# Patient Record
Sex: FEMALE | Race: WHITE | HISPANIC OR LATINO
[De-identification: names, ages, dates, MRNs, and addresses within clinical notes are randomized per-mention and may not be internally consistent; named-entity substitution may affect disease eponyms.]

---

## 2018-01-26 ENCOUNTER — APPOINTMENT (OUTPATIENT)
Dept: MAMMOGRAPHY | Facility: CLINIC | Age: 48
End: 2018-01-26
Payer: COMMERCIAL

## 2018-01-26 ENCOUNTER — OUTPATIENT (OUTPATIENT)
Dept: OUTPATIENT SERVICES | Facility: HOSPITAL | Age: 48
LOS: 1 days | End: 2018-01-26
Payer: COMMERCIAL

## 2018-01-26 ENCOUNTER — APPOINTMENT (OUTPATIENT)
Dept: ULTRASOUND IMAGING | Facility: CLINIC | Age: 48
End: 2018-01-26
Payer: COMMERCIAL

## 2018-01-26 DIAGNOSIS — Z98.89 OTHER SPECIFIED POSTPROCEDURAL STATES: Chronic | ICD-10-CM

## 2018-01-26 DIAGNOSIS — D17.1 BENIGN LIPOMATOUS NEOPLASM OF SKIN AND SUBCUTANEOUS TISSUE OF TRUNK: Chronic | ICD-10-CM

## 2018-01-26 DIAGNOSIS — D48.9 NEOPLASM OF UNCERTAIN BEHAVIOR, UNSPECIFIED: Chronic | ICD-10-CM

## 2018-01-26 DIAGNOSIS — Z12.31 ENCOUNTER FOR SCREENING MAMMOGRAM FOR MALIGNANT NEOPLASM OF BREAST: ICD-10-CM

## 2018-01-26 PROCEDURE — 77063 BREAST TOMOSYNTHESIS BI: CPT | Mod: 26

## 2018-01-26 PROCEDURE — 77067 SCR MAMMO BI INCL CAD: CPT | Mod: 26

## 2018-01-26 PROCEDURE — 76641 ULTRASOUND BREAST COMPLETE: CPT | Mod: 26,50

## 2018-01-26 PROCEDURE — 77067 SCR MAMMO BI INCL CAD: CPT

## 2018-01-26 PROCEDURE — 77063 BREAST TOMOSYNTHESIS BI: CPT

## 2018-01-26 PROCEDURE — 76641 ULTRASOUND BREAST COMPLETE: CPT

## 2018-03-05 ENCOUNTER — EMERGENCY (EMERGENCY)
Facility: HOSPITAL | Age: 48
LOS: 1 days | Discharge: HOME | End: 2018-03-05
Attending: EMERGENCY MEDICINE | Admitting: EMERGENCY MEDICINE
Payer: COMMERCIAL

## 2018-03-05 VITALS — HEIGHT: 62 IN | WEIGHT: 250 LBS

## 2018-03-05 VITALS
OXYGEN SATURATION: 98 % | RESPIRATION RATE: 18 BRPM | DIASTOLIC BLOOD PRESSURE: 67 MMHG | HEART RATE: 98 BPM | TEMPERATURE: 98 F | SYSTOLIC BLOOD PRESSURE: 100 MMHG

## 2018-03-05 DIAGNOSIS — Z98.89 OTHER SPECIFIED POSTPROCEDURAL STATES: Chronic | ICD-10-CM

## 2018-03-05 DIAGNOSIS — D17.1 BENIGN LIPOMATOUS NEOPLASM OF SKIN AND SUBCUTANEOUS TISSUE OF TRUNK: Chronic | ICD-10-CM

## 2018-03-05 DIAGNOSIS — D48.9 NEOPLASM OF UNCERTAIN BEHAVIOR, UNSPECIFIED: Chronic | ICD-10-CM

## 2018-03-05 LAB
D DIMER BLD IA.RAPID-MCNC: 214 NG/ML DDU — SIGNIFICANT CHANGE UP
HCT VFR BLD CALC: 40.2 % — SIGNIFICANT CHANGE UP (ref 37–47)
HGB BLD-MCNC: 13.1 G/DL — SIGNIFICANT CHANGE UP (ref 12–16)
MCHC RBC-ENTMCNC: 27 PG — SIGNIFICANT CHANGE UP (ref 27–31)
MCHC RBC-ENTMCNC: 32.6 G/DL — SIGNIFICANT CHANGE UP (ref 32–36)
MCV RBC AUTO: 82.9 FL — SIGNIFICANT CHANGE UP (ref 81–99)
PLATELET # BLD AUTO: 260 K/UL — SIGNIFICANT CHANGE UP (ref 150–400)
RBC # BLD: 4.85 M/UL — SIGNIFICANT CHANGE UP (ref 4.4–5.2)
RBC # FLD: 13.9 % — SIGNIFICANT CHANGE UP (ref 11–15.6)
TROPONIN T SERPL-MCNC: <0.01 NG/ML — SIGNIFICANT CHANGE UP (ref 0–0.06)
WBC # BLD: 7.8 K/UL — SIGNIFICANT CHANGE UP (ref 4.8–10.8)
WBC # FLD AUTO: 7.8 K/UL — SIGNIFICANT CHANGE UP (ref 4.8–10.8)

## 2018-03-05 PROCEDURE — 71046 X-RAY EXAM CHEST 2 VIEWS: CPT | Mod: 26

## 2018-03-05 PROCEDURE — 93010 ELECTROCARDIOGRAM REPORT: CPT

## 2018-03-05 PROCEDURE — 99284 EMERGENCY DEPT VISIT MOD MDM: CPT

## 2018-03-05 RX ORDER — METRONIDAZOLE 500 MG
500 TABLET ORAL ONCE
Qty: 0 | Refills: 0 | Status: COMPLETED | OUTPATIENT
Start: 2018-03-05 | End: 2018-03-05

## 2018-03-05 RX ORDER — CIPROFLOXACIN LACTATE 400MG/40ML
500 VIAL (ML) INTRAVENOUS ONCE
Qty: 0 | Refills: 0 | Status: COMPLETED | OUTPATIENT
Start: 2018-03-05 | End: 2018-03-05

## 2018-03-05 RX ORDER — SODIUM CHLORIDE 9 MG/ML
1000 INJECTION INTRAMUSCULAR; INTRAVENOUS; SUBCUTANEOUS ONCE
Qty: 0 | Refills: 0 | Status: COMPLETED | OUTPATIENT
Start: 2018-03-05 | End: 2018-03-05

## 2018-03-05 RX ORDER — MOXIFLOXACIN HYDROCHLORIDE TABLETS, 400 MG 400 MG/1
1 TABLET, FILM COATED ORAL
Qty: 6 | Refills: 0 | OUTPATIENT
Start: 2018-03-05 | End: 2018-03-07

## 2018-03-05 RX ORDER — ONDANSETRON 8 MG/1
4 TABLET, FILM COATED ORAL ONCE
Qty: 0 | Refills: 0 | Status: COMPLETED | OUTPATIENT
Start: 2018-03-05 | End: 2018-03-05

## 2018-03-05 RX ORDER — METRONIDAZOLE 500 MG
1 TABLET ORAL
Qty: 21 | Refills: 0 | OUTPATIENT
Start: 2018-03-05 | End: 2018-03-11

## 2018-03-05 RX ADMIN — Medication 500 MILLIGRAM(S): at 21:30

## 2018-03-05 RX ADMIN — SODIUM CHLORIDE 1000 MILLILITER(S): 9 INJECTION INTRAMUSCULAR; INTRAVENOUS; SUBCUTANEOUS at 21:31

## 2018-03-05 RX ADMIN — ONDANSETRON 4 MILLIGRAM(S): 8 TABLET, FILM COATED ORAL at 21:30

## 2018-03-05 NOTE — ED PROVIDER NOTE - OBJECTIVE STATEMENT
46 yo F presented to ED with c/o diarrhea x 3 days. Pt was in [place] x 1 week - return yesterday, buit reports diarrhea since sat. No fevers/chills. Pt reports traved alone. No bloody stool. Some campy abd pain. 48 yo F Hypothyroidism and pericarditis presented to ED with c/o diarrhea x 3 days. Pt was in [place] x 1 week - return yesterday, built reports diarrhea since sat. No fevers/chills. Pt reports traveled alone. No bloody stool. Some campy abd pain. Pt also reports some "chest discomfort" while on airplane at 930am yesterday but resolved after 10 min. No SOB. NO diaphoresis. nonsmoker. No OCP. + nausea. No vomiting.

## 2018-03-05 NOTE — ED PROVIDER NOTE - PROGRESS NOTE DETAILS
results discussed with PT. PT reassessed; PT abdomen soft non tender, PT stable. PT states she feels better and wishes to go home. PT given referral to GI MD. rx abx. PT verbalized understanding of diagnosis and importance of follow up at [facility]. PT educated on importance of follow up and when to return to the ED.

## 2018-03-05 NOTE — ED PROVIDER NOTE - ATTENDING CONTRIBUTION TO CARE
I personally saw the patient with the PA, and completed the key components of the history and physical exam. I then discussed the management plan with the PA.    48 yo F w/ h/o hypothyroidism and pericarditis presented to ED with c/o diarrhea x 3 days. Recent traip to [place] x 1 week - return yesterday, with new onset watery stools since Saturday. Denies fevers/chills/ focal pain/blood in stool.  Some campy abd pain. Pt also reports some "chest discomfort" while on airplane at 930am yesterday but resolved after 10 min. Pain at that time was nonradiating, not associated with any other symptoms.   No OCP. + nausea. No vomiting. She denies any prior cardiac history despite her diagnosis of pericarditis in the past.  VS:  mild tachycardia    GEN - NAD; well appearing; A+O x3   HEAD - NC/AT     ENT - PEERL, EOMI, mucous membranes  moist , no discharge      NECK: Neck supple, non-tender without lymphadenopathy, no masses, no JVD  PULM - CTA b/l,  symmetric breath sounds  COR -  normal heart sounds    ABD - , ND, NT, soft, no guarding, no rebound, no masses    BACK - no CVA tenderness, nontender spine     EXTREMS - no edema, no deformity, warm and well perfused    SKIN - no rash or bruising      NEUROLOGIC - alert, CN 2-12 intact, sensation nl, motor 5/5 RUE/LUE/RLE/LLE.      IMP: Pt presents with watery diarrhea with recent travel to [place].  No focal abdominal tenderness;  also transient chest pain yesterday with no acute EKG changes compared to prior EKG from 2013.  Plan to check labs, send stool studies if pt able to provide sample, check troponin and refer for outpt GI and cardiology f/u.    · Presenting Symptoms: CRAMPS, DIARRHEA, NAUSEA

## 2018-03-05 NOTE — ED PROVIDER NOTE - MEDICAL DECISION MAKING DETAILS
stool culture, cirpo/flagyl Travelers diarrhea- fluid hydration, stool culture, cirpo/flagyl. Given h/o pericarditis and CP will check d-dimer, trop, cxr and ekg

## 2018-03-05 NOTE — ED ADULT NURSE NOTE - OBJECTIVE STATEMENT
Pt axox3 c/o b/l lower abd pain with nausea and vomiting starting saturday. PT recent returned from [place]  on 3/4/2018

## 2018-03-06 PROCEDURE — 96374 THER/PROPH/DIAG INJ IV PUSH: CPT

## 2018-03-06 PROCEDURE — 80053 COMPREHEN METABOLIC PANEL: CPT

## 2018-03-06 PROCEDURE — 93005 ELECTROCARDIOGRAM TRACING: CPT

## 2018-03-06 PROCEDURE — 85379 FIBRIN DEGRADATION QUANT: CPT

## 2018-03-06 PROCEDURE — 87493 C DIFF AMPLIFIED PROBE: CPT

## 2018-03-06 PROCEDURE — 99284 EMERGENCY DEPT VISIT MOD MDM: CPT | Mod: 25

## 2018-03-06 PROCEDURE — 85027 COMPLETE CBC AUTOMATED: CPT

## 2018-03-06 PROCEDURE — 84484 ASSAY OF TROPONIN QUANT: CPT

## 2018-03-06 PROCEDURE — 71046 X-RAY EXAM CHEST 2 VIEWS: CPT

## 2018-03-06 PROCEDURE — 36415 COLL VENOUS BLD VENIPUNCTURE: CPT

## 2018-03-07 LAB
C DIFF BY PCR RESULT: SIGNIFICANT CHANGE UP
C DIFF TOX GENS STL QL NAA+PROBE: SIGNIFICANT CHANGE UP

## 2018-03-07 NOTE — ED POST DISCHARGE NOTE - RESULT SUMMARY
spoke to lab, GI PCN unable to process due to low sample, advised to f/u with PCP to rpt stool studies if symptomatic, LM

## 2018-04-11 ENCOUNTER — NON-APPOINTMENT (OUTPATIENT)
Age: 48
End: 2018-04-11

## 2018-04-11 ENCOUNTER — APPOINTMENT (OUTPATIENT)
Dept: CARDIOLOGY | Facility: CLINIC | Age: 48
End: 2018-04-11
Payer: COMMERCIAL

## 2018-04-11 ENCOUNTER — TRANSCRIPTION ENCOUNTER (OUTPATIENT)
Age: 48
End: 2018-04-11

## 2018-04-11 VITALS
HEART RATE: 98 BPM | SYSTOLIC BLOOD PRESSURE: 134 MMHG | BODY MASS INDEX: 38.96 KG/M2 | OXYGEN SATURATION: 98 % | WEIGHT: 213 LBS | DIASTOLIC BLOOD PRESSURE: 80 MMHG

## 2018-04-11 PROCEDURE — 93000 ELECTROCARDIOGRAM COMPLETE: CPT

## 2018-04-11 PROCEDURE — 99243 OFF/OP CNSLTJ NEW/EST LOW 30: CPT | Mod: 25

## 2018-04-11 RX ORDER — FAMOTIDINE 20 MG/1
20 TABLET, FILM COATED ORAL
Qty: 180 | Refills: 0 | Status: ACTIVE | COMMUNITY
Start: 2018-04-11

## 2018-04-11 RX ORDER — OMALIZUMAB 202.5 MG/1.4ML
150 INJECTION, SOLUTION SUBCUTANEOUS
Qty: 1 | Refills: 0 | Status: ACTIVE | COMMUNITY
Start: 2018-04-11

## 2018-04-24 LAB
ANION GAP SERPL CALC-SCNC: 14 MMOL/L
BASOPHILS # BLD AUTO: 0.04 K/UL
BASOPHILS NFR BLD AUTO: 0.6 %
BUN SERPL-MCNC: 16 MG/DL
CALCIUM SERPL-MCNC: 9.6 MG/DL
CHLORIDE SERPL-SCNC: 102 MMOL/L
CHOLEST SERPL-MCNC: 172 MG/DL
CHOLEST/HDLC SERPL: 2.5 RATIO
CK SERPL-CCNC: 145 U/L
CO2 SERPL-SCNC: 24 MMOL/L
CREAT SERPL-MCNC: 0.93 MG/DL
EOSINOPHIL # BLD AUTO: 0.1 K/UL
EOSINOPHIL NFR BLD AUTO: 1.5 %
GLUCOSE SERPL-MCNC: 95 MG/DL
HBA1C MFR BLD HPLC: 5.9 %
HCT VFR BLD CALC: 40.9 %
HDLC SERPL-MCNC: 68 MG/DL
HGB BLD-MCNC: 13.3 G/DL
IMM GRANULOCYTES NFR BLD AUTO: 0 %
LDLC SERPL CALC-MCNC: 91 MG/DL
LYMPHOCYTES # BLD AUTO: 2.27 K/UL
LYMPHOCYTES NFR BLD AUTO: 34.6 %
MAN DIFF?: NORMAL
MCHC RBC-ENTMCNC: 27.4 PG
MCHC RBC-ENTMCNC: 32.5 GM/DL
MCV RBC AUTO: 84.3 FL
MONOCYTES # BLD AUTO: 0.39 K/UL
MONOCYTES NFR BLD AUTO: 5.9 %
NEUTROPHILS # BLD AUTO: 3.76 K/UL
NEUTROPHILS NFR BLD AUTO: 57.4 %
PLATELET # BLD AUTO: 273 K/UL
POTASSIUM SERPL-SCNC: 4.8 MMOL/L
RBC # BLD: 4.85 M/UL
RBC # FLD: 14.5 %
SODIUM SERPL-SCNC: 140 MMOL/L
TRIGL SERPL-MCNC: 63 MG/DL
TSH SERPL-ACNC: 1.61 UIU/ML
WBC # FLD AUTO: 6.56 K/UL

## 2018-05-22 ENCOUNTER — APPOINTMENT (OUTPATIENT)
Dept: CARDIOLOGY | Facility: CLINIC | Age: 48
End: 2018-05-22
Payer: COMMERCIAL

## 2018-05-22 PROCEDURE — 78452 HT MUSCLE IMAGE SPECT MULT: CPT

## 2018-05-22 PROCEDURE — A9500: CPT

## 2018-05-22 PROCEDURE — 93015 CV STRESS TEST SUPVJ I&R: CPT

## 2018-05-22 PROCEDURE — 93306 TTE W/DOPPLER COMPLETE: CPT

## 2018-07-25 ENCOUNTER — APPOINTMENT (OUTPATIENT)
Dept: CARDIOLOGY | Facility: CLINIC | Age: 48
End: 2018-07-25

## 2018-07-30 PROBLEM — I31.9 DISEASE OF PERICARDIUM, UNSPECIFIED: Chronic | Status: ACTIVE | Noted: 2018-03-05

## 2018-08-17 ENCOUNTER — EMERGENCY (EMERGENCY)
Facility: HOSPITAL | Age: 48
LOS: 1 days | Discharge: HOME | End: 2018-08-17
Attending: EMERGENCY MEDICINE
Payer: COMMERCIAL

## 2018-08-17 VITALS
SYSTOLIC BLOOD PRESSURE: 141 MMHG | HEIGHT: 62 IN | OXYGEN SATURATION: 100 % | RESPIRATION RATE: 18 BRPM | WEIGHT: 210.1 LBS | TEMPERATURE: 98 F | HEART RATE: 87 BPM | DIASTOLIC BLOOD PRESSURE: 88 MMHG

## 2018-08-17 DIAGNOSIS — Z98.89 OTHER SPECIFIED POSTPROCEDURAL STATES: Chronic | ICD-10-CM

## 2018-08-17 DIAGNOSIS — D17.1 BENIGN LIPOMATOUS NEOPLASM OF SKIN AND SUBCUTANEOUS TISSUE OF TRUNK: Chronic | ICD-10-CM

## 2018-08-17 DIAGNOSIS — D48.9 NEOPLASM OF UNCERTAIN BEHAVIOR, UNSPECIFIED: Chronic | ICD-10-CM

## 2018-08-17 PROCEDURE — 99283 EMERGENCY DEPT VISIT LOW MDM: CPT

## 2018-08-17 PROCEDURE — 73660 X-RAY EXAM OF TOE(S): CPT

## 2018-08-17 PROCEDURE — 73660 X-RAY EXAM OF TOE(S): CPT | Mod: 26,LT

## 2018-08-17 RX ORDER — CEPHALEXIN 500 MG
1 CAPSULE ORAL
Qty: 14 | Refills: 0 | OUTPATIENT
Start: 2018-08-17 | End: 2018-08-23

## 2018-08-17 NOTE — ED STATDOCS - OBJECTIVE STATEMENT
48 year olf female, hx of ingrown toe nails presenting with left ingrown toe nail and stubbed the toe yesterday. states that she tried to get an apt with [facility] and that he could not see her until 1 week. states that she tried to cut the nail herself. states that she had some discoloration and drainage from the toe. denies fever chills nausea or vomiting. numbness or tingling in the foot. DMII controlled on medications.

## 2018-08-17 NOTE — ED STATDOCS - PROGRESS NOTE DETAILS
multiple calls made to podiatry  DC with keflex and FU in office tmrw. pt states that she called podiatry office and they squeeze her in

## 2018-08-17 NOTE — ED STATDOCS - ATTENDING CONTRIBUTION TO CARE
49 yo F, hx of DM, presented with ingrown left toe nail and pain after she stubbed her toe. Full ROM. mild  erythema around the nail, no purulent drainage. sensation intact. Multiple call to podiatry without response. patient comfortable going on Keflex and outpatient follow up.

## 2018-08-17 NOTE — ED STATDOCS - PHYSICAL EXAMINATION
LEFT BIG TOE: + ingrown nail with blistering to the distal toe. no erythema noted. no fluctuance or induration. FROM of all toes 2+ dp/pt. sensation intact.

## 2018-11-19 ENCOUNTER — APPOINTMENT (OUTPATIENT)
Dept: ENDOCRINOLOGY | Facility: CLINIC | Age: 48
End: 2018-11-19

## 2018-12-17 ENCOUNTER — RX RENEWAL (OUTPATIENT)
Age: 48
End: 2018-12-17

## 2019-02-08 ENCOUNTER — RX RENEWAL (OUTPATIENT)
Age: 49
End: 2019-02-08

## 2019-03-02 ENCOUNTER — LABORATORY RESULT (OUTPATIENT)
Age: 49
End: 2019-03-02

## 2019-03-05 LAB
25(OH)D3 SERPL-MCNC: 30.6 NG/ML
ALBUMIN SERPL ELPH-MCNC: 4.2 G/DL
ALP BLD-CCNC: 82 U/L
ALT SERPL-CCNC: 18 U/L
ANION GAP SERPL CALC-SCNC: 9 MMOL/L
APPEARANCE: ABNORMAL
AST SERPL-CCNC: 19 U/L
BASOPHILS # BLD AUTO: 0.02 K/UL
BASOPHILS NFR BLD AUTO: 0.3 %
BILIRUB SERPL-MCNC: 0.3 MG/DL
BILIRUBIN URINE: NEGATIVE
BLOOD URINE: NEGATIVE
BUN SERPL-MCNC: 14 MG/DL
CALCIUM SERPL-MCNC: 9.1 MG/DL
CHLORIDE SERPL-SCNC: 103 MMOL/L
CHOLEST SERPL-MCNC: 134 MG/DL
CHOLEST/HDLC SERPL: 2.7 RATIO
CO2 SERPL-SCNC: 27 MMOL/L
COLOR: YELLOW
CREAT SERPL-MCNC: 0.82 MG/DL
CREAT SPEC-SCNC: 209 MG/DL
EOSINOPHIL # BLD AUTO: 0.09 K/UL
EOSINOPHIL NFR BLD AUTO: 1.5 %
GLUCOSE QUALITATIVE U: NEGATIVE
GLUCOSE SERPL-MCNC: 86 MG/DL
HBA1C MFR BLD HPLC: 5.8 %
HCT VFR BLD CALC: 35.9 %
HDLC SERPL-MCNC: 50 MG/DL
HGB BLD-MCNC: 11.2 G/DL
IMM GRANULOCYTES NFR BLD AUTO: 0.2 %
KETONES URINE: NEGATIVE
LDLC SERPL CALC-MCNC: 71 MG/DL
LEUKOCYTE ESTERASE URINE: NEGATIVE
LYMPHOCYTES # BLD AUTO: 2.18 K/UL
LYMPHOCYTES NFR BLD AUTO: 37.1 %
MAN DIFF?: NORMAL
MCHC RBC-ENTMCNC: 25.7 PG
MCHC RBC-ENTMCNC: 31.2 GM/DL
MCV RBC AUTO: 82.5 FL
MICROALBUMIN 24H UR DL<=1MG/L-MCNC: <1.2 MG/DL
MICROALBUMIN/CREAT 24H UR-RTO: NORMAL MG/G
MONOCYTES # BLD AUTO: 0.36 K/UL
MONOCYTES NFR BLD AUTO: 6.1 %
NEUTROPHILS # BLD AUTO: 3.22 K/UL
NEUTROPHILS NFR BLD AUTO: 54.8 %
NITRITE URINE: NEGATIVE
PH URINE: 6.5
PLATELET # BLD AUTO: 249 K/UL
POTASSIUM SERPL-SCNC: 4.1 MMOL/L
PROT SERPL-MCNC: 6.8 G/DL
PROTEIN URINE: NORMAL
RBC # BLD: 4.35 M/UL
RBC # FLD: 14.4 %
SODIUM SERPL-SCNC: 139 MMOL/L
SPECIFIC GRAVITY URINE: 1.03
T3FREE SERPL-MCNC: 3.21 PG/ML
T4 FREE SERPL-MCNC: 1.8 NG/DL
TRIGL SERPL-MCNC: 67 MG/DL
TSH SERPL-ACNC: 1.08 UIU/ML
UROBILINOGEN URINE: NORMAL
VIT B12 SERPL-MCNC: 506 PG/ML
WBC # FLD AUTO: 5.88 K/UL

## 2019-03-06 ENCOUNTER — RECORD ABSTRACTING (OUTPATIENT)
Age: 49
End: 2019-03-06

## 2019-03-06 DIAGNOSIS — Z78.9 OTHER SPECIFIED HEALTH STATUS: ICD-10-CM

## 2019-03-08 ENCOUNTER — APPOINTMENT (OUTPATIENT)
Dept: ENDOCRINOLOGY | Facility: CLINIC | Age: 49
End: 2019-03-08
Payer: COMMERCIAL

## 2019-03-08 VITALS
DIASTOLIC BLOOD PRESSURE: 70 MMHG | HEART RATE: 74 BPM | BODY MASS INDEX: 37.54 KG/M2 | OXYGEN SATURATION: 98 % | HEIGHT: 62 IN | WEIGHT: 204 LBS | SYSTOLIC BLOOD PRESSURE: 110 MMHG

## 2019-03-08 LAB — GLUCOSE BLDC GLUCOMTR-MCNC: 172

## 2019-03-08 PROCEDURE — 82962 GLUCOSE BLOOD TEST: CPT

## 2019-03-08 PROCEDURE — 99214 OFFICE O/P EST MOD 30 MIN: CPT | Mod: 25

## 2019-03-08 RX ORDER — DULAGLUTIDE 0.75 MG/.5ML
0.75 INJECTION, SOLUTION SUBCUTANEOUS
Qty: 3 | Refills: 0 | Status: DISCONTINUED | COMMUNITY
Start: 2018-04-11 | End: 2019-03-08

## 2019-03-09 NOTE — ASSESSMENT
[FreeTextEntry1] : 49 y/o female with Type 2 DM, and Hypothyroidism. Labs reviewed from 3/2/19- A1C 5.8%, chol 134, FBG 86, and TSH 1.08.\par \par Plan: \par Type 2 DM: Continue current medication regimen: Trulicity 1.5 mg weekly\par - continue self BS monitoring\par - educated on healthy food choices\par - encouraged to increase routine exercise\par \par Hypothyroidism: monitor labs, continue current dose of Levothyroxine 125 mcg daily\par \par Labs and follow up in 3 months.\par \par

## 2019-03-09 NOTE — REVIEW OF SYSTEMS
[Fatigue] : fatigue [Recent Weight Loss (___ Lbs)] : recent [unfilled] ~Ulb weight loss [Blurry Vision] : blurred vision [SOB on Exertion] : shortness of breath during exertion [Polyuria] : polyuria [Hair Loss] : hair loss [Dry Skin] : dry skin [Headache] : headaches [Cold Intolerance] : cold intolerant [Decreased Appetite] : appetite not decreased [Visual Field Defect] : no visual field defect [Dysphagia] : no dysphagia [Dysphonia] : no dysphonia [Neck Pain] : no neck pain [Chest Pain] : no chest pain [Palpitations] : no palpitations [Constipation] : no constipation [Diarrhea] : no diarrhea [Dysuria] : no dysuria [Tremors] : no tremors [Depression] : no depression [Anxiety] : no anxiety [Polydipsia] : no polydipsia [Heat Intolerance] : heat tolerant [Easy Bruising] : no tendency for easy bruising [Swelling] : no swelling [de-identified] : once a month or once every couple of months

## 2019-03-09 NOTE — HISTORY OF PRESENT ILLNESS
[FreeTextEntry1] : Quality: Type 2 DM\par Severity: controlled\par Duration: a couple years ago\par Onset: GDM 19 years ago, routine labs\par Modifying Factors: Better with medication\par Associated Symptoms:\par \par Current Regimen:\par Trulicity 1.5 mg weekly\par \par -Levothyroxine 125 mcg daily\par \par Self blood sugar monitorin-2 times a day, per logs\par before and after meals BS < 130\par no hypoglycemic symptoms\par \par exercise: none\par \par Diet: Weight watchers\par B- oatmeal, almond milk, egg whites with spinach, whole wheat, coffee with some sugar\par L- turkey chilli, brown rice,\par D- pizza, varies\par Snacks- fruit \par \par Date of last eye exam: 2 years ago  (-) diabetic retinopathy\par Date of last foot exam: every 6 months\par Date of last flu vaccine: 2018\par Date of last Pneumovax: no

## 2019-03-09 NOTE — PHYSICAL EXAM
[Alert] : alert [No Acute Distress] : no acute distress [Well Nourished] : well nourished [Well Developed] : well developed [Normal Sclera/Conjunctiva] : normal sclera/conjunctiva [EOMI] : extra ocular movement intact [Supple] : the neck was supple [No LAD] : no lymphadenopathy [Thyroid Not Enlarged] : the thyroid was not enlarged [No Thyroid Nodules] : there were no palpable thyroid nodules [Normal Rate and Effort] : normal respiratory rhythm and effort [No Accessory Muscle Use] : no accessory muscle use [Clear to Auscultation] : lungs were clear to auscultation bilaterally [Normal Rate] : heart rate was normal  [Normal S1, S2] : normal S1 and S2 [Regular Rhythm] : with a regular rhythm [Pedal Pulses Normal] : the pedal pulses are present [No Edema] : there was no peripheral edema [Normal Bowel Sounds] : normal bowel sounds [Not Tender] : non-tender [Soft] : abdomen soft [Normal Gait] : normal gait [Acanthosis Nigricans] : no acanthosis nigricans [Right Foot Was Examined] : right foot ~C was examined [Left Foot Was Examined] : left foot ~C was examined [Normal] : normal [Full ROM] : with full range of motion [Diminished Throughout Both Feet] : normal tactile sensation with monofilament testing throughout both feet [No Motor Deficits] : the motor exam was normal [No Tremors] : no tremors [Oriented x3] : oriented to person, place, and time [Normal Insight/Judgement] : insight and judgment were intact [Normal Mood] : the mood was normal

## 2019-05-18 ENCOUNTER — APPOINTMENT (OUTPATIENT)
Dept: ULTRASOUND IMAGING | Facility: CLINIC | Age: 49
End: 2019-05-18
Payer: COMMERCIAL

## 2019-05-18 ENCOUNTER — APPOINTMENT (OUTPATIENT)
Dept: MAMMOGRAPHY | Facility: CLINIC | Age: 49
End: 2019-05-18
Payer: COMMERCIAL

## 2019-05-18 ENCOUNTER — OUTPATIENT (OUTPATIENT)
Dept: OUTPATIENT SERVICES | Facility: HOSPITAL | Age: 49
LOS: 1 days | End: 2019-05-18
Payer: COMMERCIAL

## 2019-05-18 DIAGNOSIS — D48.9 NEOPLASM OF UNCERTAIN BEHAVIOR, UNSPECIFIED: Chronic | ICD-10-CM

## 2019-05-18 DIAGNOSIS — Z98.89 OTHER SPECIFIED POSTPROCEDURAL STATES: Chronic | ICD-10-CM

## 2019-05-18 DIAGNOSIS — D17.1 BENIGN LIPOMATOUS NEOPLASM OF SKIN AND SUBCUTANEOUS TISSUE OF TRUNK: Chronic | ICD-10-CM

## 2019-05-18 DIAGNOSIS — Z12.31 ENCOUNTER FOR SCREENING MAMMOGRAM FOR MALIGNANT NEOPLASM OF BREAST: ICD-10-CM

## 2019-05-18 PROCEDURE — 76641 ULTRASOUND BREAST COMPLETE: CPT

## 2019-05-18 PROCEDURE — 77063 BREAST TOMOSYNTHESIS BI: CPT | Mod: 26

## 2019-05-18 PROCEDURE — 77067 SCR MAMMO BI INCL CAD: CPT

## 2019-05-18 PROCEDURE — 77067 SCR MAMMO BI INCL CAD: CPT | Mod: 26

## 2019-05-18 PROCEDURE — 76641 ULTRASOUND BREAST COMPLETE: CPT | Mod: 26,50

## 2019-05-18 PROCEDURE — 77063 BREAST TOMOSYNTHESIS BI: CPT

## 2019-07-02 ENCOUNTER — MEDICATION RENEWAL (OUTPATIENT)
Age: 49
End: 2019-07-02

## 2019-07-02 ENCOUNTER — APPOINTMENT (OUTPATIENT)
Dept: ENDOCRINOLOGY | Facility: CLINIC | Age: 49
End: 2019-07-02

## 2019-08-07 ENCOUNTER — APPOINTMENT (OUTPATIENT)
Dept: ENDOCRINOLOGY | Facility: CLINIC | Age: 49
End: 2019-08-07
Payer: COMMERCIAL

## 2019-08-07 VITALS
HEART RATE: 91 BPM | DIASTOLIC BLOOD PRESSURE: 70 MMHG | OXYGEN SATURATION: 98 % | BODY MASS INDEX: 37.17 KG/M2 | HEIGHT: 62 IN | SYSTOLIC BLOOD PRESSURE: 110 MMHG | WEIGHT: 202 LBS

## 2019-08-07 LAB — GLUCOSE BLDC GLUCOMTR-MCNC: 86

## 2019-08-07 PROCEDURE — 82962 GLUCOSE BLOOD TEST: CPT

## 2019-08-07 PROCEDURE — 99214 OFFICE O/P EST MOD 30 MIN: CPT | Mod: 25

## 2019-08-07 NOTE — PHYSICAL EXAM
[Alert] : alert [No Acute Distress] : no acute distress [Well Nourished] : well nourished [Normal Sclera/Conjunctiva] : normal sclera/conjunctiva [Well Developed] : well developed [EOMI] : extra ocular movement intact [Supple] : the neck was supple [No LAD] : no lymphadenopathy [Thyroid Not Enlarged] : the thyroid was not enlarged [No Thyroid Nodules] : there were no palpable thyroid nodules [Normal Rate and Effort] : normal respiratory rhythm and effort [No Accessory Muscle Use] : no accessory muscle use [Clear to Auscultation] : lungs were clear to auscultation bilaterally [Normal Rate] : heart rate was normal  [Normal S1, S2] : normal S1 and S2 [Regular Rhythm] : with a regular rhythm [Pedal Pulses Normal] : the pedal pulses are present [No Edema] : there was no peripheral edema [Normal Bowel Sounds] : normal bowel sounds [Not Tender] : non-tender [Soft] : abdomen soft [Normal Gait] : normal gait [Acanthosis Nigricans] : no acanthosis nigricans [Right Foot Was Examined] : right foot ~C was examined [Left Foot Was Examined] : left foot ~C was examined [Normal] : normal [Full ROM] : with full range of motion [Diminished Throughout Both Feet] : normal tactile sensation with monofilament testing throughout both feet [No Motor Deficits] : the motor exam was normal [No Tremors] : no tremors [Oriented x3] : oriented to person, place, and time [Normal Insight/Judgement] : insight and judgment were intact [Normal Mood] : the mood was normal

## 2019-08-07 NOTE — REVIEW OF SYSTEMS
[Fatigue] : fatigue [Recent Weight Loss (___ Lbs)] : recent [unfilled] ~Ulb weight loss [Neck Pain] : neck pain [Heartburn] : heartburn [Polyuria] : polyuria [Hair Loss] : hair loss [Dry Skin] : dry skin [Headache] : headaches [Anxiety] : anxiety [Polydipsia] : polydipsia [Decreased Appetite] : appetite not decreased [Blurry Vision] : no blurred vision [Visual Field Defect] : no visual field defect [Dysphonia] : no dysphonia [Dysphagia] : no dysphagia [Chest Pain] : no chest pain [Palpitations] : no palpitations [Constipation] : no constipation [SOB on Exertion] : no shortness of breath during exertion [Dysuria] : no dysuria [Diarrhea] : no diarrhea [Tremors] : no tremors [Depression] : no depression [Heat Intolerance] : heat tolerant [Cold Intolerance] : cold tolerant [Easy Bruising] : no tendency for easy bruising [Swelling] : no swelling [FreeTextEntry4] : posterior neck pain  [de-identified] : occasional

## 2019-08-07 NOTE — HISTORY OF PRESENT ILLNESS
[FreeTextEntry1] : Quality: Type 2 DM\par Severity: controlled\par Duration: a couple years ago\par Onset: GDM 19 years ago, routine labs\par Modifying Factors: Better with medication\par Associated Symptoms: denies neuropathy \par \par Current Regimen:\par Trulicity 1.5 mg weekly\par \par -Levothyroxine 125 mcg daily- taking appropriately \par \par Self blood sugar monitoring: not testing, lost meter \par no hypoglycemic symptoms\par \par exercise: none\par \par Diet: stopped Weight watchers\par B- English muffin with peanut butter or ham and cheese, coffee with some sugar and milk \par L- brown rice with chicken\par D- steak, varies\par Snacks- fruit, gluten free pretzels  \par \par Date of last eye exam: 2 years ago  (-) diabetic retinopathy\par Date of last foot exam: every 6 months\par Date of last flu vaccine: 2018\par Date of last Pneumovax: no

## 2019-08-07 NOTE — ASSESSMENT
[Importance of Diet and Exercise] : importance of diet and exercise to improve glycemic control, achieve weight loss and improve cardiovascular health [FreeTextEntry1] : 49 y/o female with Type 2 DM and Hypothyroidism. No recent labs\par \par Plan: \par Type 2 DM: labs drawn today -awaiting results \par - continue current medication regimen: Trulicity 1.5 mg weekly\par - sample glucose meter given\par - start self BS monitoring 1-2 times a day\par - educated on healthy food choices\par - encouraged to increase routine exercise\par - educated on the importance of routine retinopathy screening \par \par Hypothyroidism: monitor TFTs, continue current dose of Levothyroxine 125 mcg daily\par \par Labs and follow up in 3 months. [Retinopathy Screening] : Patient was referred to ophthalmology for retinopathy screening

## 2019-08-08 ENCOUNTER — RESULT REVIEW (OUTPATIENT)
Age: 49
End: 2019-08-08

## 2019-08-08 LAB
24R-OH-CALCIDIOL SERPL-MCNC: 43.6 PG/ML
ALBUMIN SERPL ELPH-MCNC: 4.3 G/DL
ALP BLD-CCNC: 86 U/L
ALT SERPL-CCNC: 23 U/L
ANION GAP SERPL CALC-SCNC: 18 MMOL/L
AST SERPL-CCNC: 19 U/L
BASOPHILS # BLD AUTO: 0.03 K/UL
BASOPHILS NFR BLD AUTO: 0.5 %
BILIRUB SERPL-MCNC: 0.2 MG/DL
BUN SERPL-MCNC: 15 MG/DL
CALCIUM SERPL-MCNC: 10 MG/DL
CHLORIDE SERPL-SCNC: 104 MMOL/L
CHOLEST SERPL-MCNC: 166 MG/DL
CHOLEST/HDLC SERPL: 2.7 RATIO
CO2 SERPL-SCNC: 20 MMOL/L
CREAT SERPL-MCNC: 0.88 MG/DL
CREAT SPEC-SCNC: 83 MG/DL
EOSINOPHIL # BLD AUTO: 0.14 K/UL
EOSINOPHIL NFR BLD AUTO: 2.1 %
GLUCOSE SERPL-MCNC: 94 MG/DL
HBA1C MFR BLD HPLC: 5.9 %
HCT VFR BLD CALC: 41.7 %
HDLC SERPL-MCNC: 61 MG/DL
HGB BLD-MCNC: 12.9 G/DL
IMM GRANULOCYTES NFR BLD AUTO: 0.2 %
LDLC SERPL CALC-MCNC: 90 MG/DL
LYMPHOCYTES # BLD AUTO: 2.12 K/UL
LYMPHOCYTES NFR BLD AUTO: 32.2 %
MAN DIFF?: NORMAL
MCHC RBC-ENTMCNC: 25 PG
MCHC RBC-ENTMCNC: 30.9 GM/DL
MCV RBC AUTO: 80.8 FL
MICROALBUMIN 24H UR DL<=1MG/L-MCNC: <1.2 MG/DL
MICROALBUMIN/CREAT 24H UR-RTO: NORMAL MG/G
MONOCYTES # BLD AUTO: 0.41 K/UL
MONOCYTES NFR BLD AUTO: 6.2 %
NEUTROPHILS # BLD AUTO: 3.88 K/UL
NEUTROPHILS NFR BLD AUTO: 58.8 %
PLATELET # BLD AUTO: 285 K/UL
POTASSIUM SERPL-SCNC: 5 MMOL/L
PROT SERPL-MCNC: 7 G/DL
RBC # BLD: 5.16 M/UL
RBC # FLD: 14.6 %
SODIUM SERPL-SCNC: 142 MMOL/L
T3FREE SERPL-MCNC: 3.97 PG/ML
T4 FREE SERPL-MCNC: 1.6 NG/DL
TRIGL SERPL-MCNC: 74 MG/DL
TSH SERPL-ACNC: 1.25 UIU/ML
VIT B12 SERPL-MCNC: 552 PG/ML
WBC # FLD AUTO: 6.59 K/UL

## 2019-08-23 ENCOUNTER — MEDICATION RENEWAL (OUTPATIENT)
Age: 49
End: 2019-08-23

## 2019-08-23 RX ORDER — BLOOD-GLUCOSE METER
W/DEVICE KIT MISCELLANEOUS
Qty: 1 | Refills: 0 | Status: ACTIVE | COMMUNITY
Start: 2019-08-23 | End: 1900-01-01

## 2019-09-04 ENCOUNTER — APPOINTMENT (OUTPATIENT)
Dept: ENDOCRINOLOGY | Facility: CLINIC | Age: 49
End: 2019-09-04

## 2019-09-29 ENCOUNTER — RX RENEWAL (OUTPATIENT)
Age: 49
End: 2019-09-29

## 2019-11-06 ENCOUNTER — APPOINTMENT (OUTPATIENT)
Dept: ENDOCRINOLOGY | Facility: CLINIC | Age: 49
End: 2019-11-06

## 2020-01-14 ENCOUNTER — RX RENEWAL (OUTPATIENT)
Age: 50
End: 2020-01-14

## 2020-01-27 ENCOUNTER — APPOINTMENT (OUTPATIENT)
Dept: ENDOCRINOLOGY | Facility: CLINIC | Age: 50
End: 2020-01-27
Payer: COMMERCIAL

## 2020-01-27 ENCOUNTER — APPOINTMENT (OUTPATIENT)
Dept: GASTROENTEROLOGY | Facility: CLINIC | Age: 50
End: 2020-01-27
Payer: COMMERCIAL

## 2020-01-27 VITALS
DIASTOLIC BLOOD PRESSURE: 72 MMHG | WEIGHT: 210 LBS | SYSTOLIC BLOOD PRESSURE: 128 MMHG | HEART RATE: 92 BPM | BODY MASS INDEX: 38.64 KG/M2 | HEIGHT: 62 IN

## 2020-01-27 VITALS
BODY MASS INDEX: 38.64 KG/M2 | RESPIRATION RATE: 17 BRPM | HEIGHT: 62 IN | WEIGHT: 210 LBS | OXYGEN SATURATION: 98 % | SYSTOLIC BLOOD PRESSURE: 120 MMHG | DIASTOLIC BLOOD PRESSURE: 72 MMHG

## 2020-01-27 DIAGNOSIS — Z87.19 PERSONAL HISTORY OF OTHER DISEASES OF THE DIGESTIVE SYSTEM: ICD-10-CM

## 2020-01-27 DIAGNOSIS — R14.1 GAS PAIN: ICD-10-CM

## 2020-01-27 DIAGNOSIS — Z78.9 OTHER SPECIFIED HEALTH STATUS: ICD-10-CM

## 2020-01-27 DIAGNOSIS — Z12.11 ENCOUNTER FOR SCREENING FOR MALIGNANT NEOPLASM OF COLON: ICD-10-CM

## 2020-01-27 DIAGNOSIS — R14.0 ABDOMINAL DISTENSION (GASEOUS): ICD-10-CM

## 2020-01-27 DIAGNOSIS — R10.13 EPIGASTRIC PAIN: ICD-10-CM

## 2020-01-27 LAB — GLUCOSE BLDC GLUCOMTR-MCNC: 138

## 2020-01-27 PROCEDURE — 99214 OFFICE O/P EST MOD 30 MIN: CPT | Mod: 25

## 2020-01-27 PROCEDURE — 82962 GLUCOSE BLOOD TEST: CPT

## 2020-01-27 PROCEDURE — 99203 OFFICE O/P NEW LOW 30 MIN: CPT

## 2020-01-27 NOTE — ASSESSMENT
[FreeTextEntry1] : 50 y/o female with Type 2 DM and Hypothyroidism. No recent labs\par \par Plan: \par Type 2 DM: controlled - check A1C today \par - continue current medication regimen: Trulicity 1.5 mg weekly\par - continue self BS monitoring \par - educated on healthy food choices - encouraged to restart Weight Watchers \par - encouraged to increase routine exercise\par - educated on the importance of routine retinopathy screening \par \par Hypothyroidism: check TFTs, continue current dose of Levothyroxine 125 mcg daily\par \par Labs and follow up in 4 months. [Importance of Diet and Exercise] : importance of diet and exercise to improve glycemic control, achieve weight loss and improve cardiovascular health

## 2020-01-27 NOTE — ADDENDUM
[FreeTextEntry1] : I have personally seen and examined the patient. I agree with the history, physical examination, assessment and recommendations as noted above.\par \par Tj Gilbert MD\par Gastroenterology\par

## 2020-01-27 NOTE — HISTORY OF PRESENT ILLNESS
[FreeTextEntry1] : Quality: Type 2 DM\par Severity: controlled\par Duration: a couple years ago\par Onset: GDM 19 years ago, routine labs\par Modifying Factors: Better with medication\par Associated Symptoms: denies neuropathy \par \par Current Regimen:\par Trulicity 1.5 mg weekly\par \par -Levothyroxine 125 mcg daily- taking appropriately \par \par Self blood sugar monitorin times a day, no meter, per logs\par before breakfast: 116, 104, 107, 106\par after breakfast: 112\par after dinner: 124\par no hypoglycemic symptoms\par  today in office \par \par exercise: none\par \par Diet: stopped Weight watchers, will get started again soon\par B- English muffin with peanut butter or ham and cheese, coffee with some sugar and milk \par L- brown rice with chicken\par D- steak, varies\par Snacks- fruit, gluten free pretzels  \par \par Date of last eye exam: 2 years ago  (-) diabetic retinopathy, needs appointment\par Date of last foot exam: sporadically \par Date of last flu vaccine: 2019\par Date of last Pneumovax: no

## 2020-01-27 NOTE — HISTORY OF PRESENT ILLNESS
[Heartburn] : stable heartburn [Nausea] : denies nausea [Vomiting] : denies vomiting [Diarrhea] : denies diarrhea [Constipation] : denies constipation [Yellow Skin Or Eyes (Jaundice)] : denies jaundice [Abdominal Pain] : stable abdominal pain [Abdominal Swelling] : abdominal swelling stable [Rectal Pain] : denies rectal pain [de-identified] : VERONICA ZAMBRANO is a 49 year old female presenting today with complaints of epigastric pain and bloating for several months. She reports that her symptoms occur several times a month. She will take Famotidine to relieve her symptoms. She has taken Omeprazole, Esomeprazole and Pantoprazole in the past for the same reasons. She had an EGD about 20 years ago but she does not recall the results. She knows that certain foods like anything spicy or greasy will trigger her symptoms so she does her best to avoid consuming those. She  denies any nausea, vomiting, dysphagia or odynophagia.\par She has never had a colonosocpy before. She is unaware of any family history of colon cancer or polyps. She moves her bowels daily. She denies any constipation, diarrhea, black or bloody stool. Her medical history includes hypothyroidism and diabetes. Her BMI is 38.41.

## 2020-01-27 NOTE — ASSESSMENT
[FreeTextEntry1] : The patient presents today with complaints of epigastric pain, bloating and occasional heartburn. She will be scheduled for an upper endoscopy for further evaluation. She will continue to take Famotidine as needed for symptom control. She was instructed on both an anti reflux diet and a low FODMAP diet. She will also be scheduled for a colonoscopy with Suprep. I have discussed the indications, benefits, risks, and alternatives to EGD and colonoscopy with the patient. The patient understands all options and has agreed to have both procedures. She is medically optimized.

## 2020-01-27 NOTE — REASON FOR VISIT
[Initial Evaluation] : an initial evaluation [FreeTextEntry1] : epigastric pain, bloating, colon cancer screening

## 2020-01-27 NOTE — PHYSICAL EXAM
[Alert] : alert [No Acute Distress] : no acute distress [Well Nourished] : well nourished [Well Developed] : well developed [Normal Sclera/Conjunctiva] : normal sclera/conjunctiva [EOMI] : extra ocular movement intact [Supple] : the neck was supple [No LAD] : no lymphadenopathy [Thyroid Not Enlarged] : the thyroid was not enlarged [No Thyroid Nodules] : there were no palpable thyroid nodules [Normal Rate and Effort] : normal respiratory rhythm and effort [No Accessory Muscle Use] : no accessory muscle use [Clear to Auscultation] : lungs were clear to auscultation bilaterally [Normal Rate] : heart rate was normal  [Normal S1, S2] : normal S1 and S2 [Regular Rhythm] : with a regular rhythm [Pedal Pulses Normal] : the pedal pulses are present [No Edema] : there was no peripheral edema [Normal Bowel Sounds] : normal bowel sounds [Not Tender] : non-tender [Soft] : abdomen soft [Normal Gait] : normal gait [No Rash] : no rash [Foot Ulcers] : no foot ulcers [Acanthosis Nigricans] : no acanthosis nigricans [Right Foot Was Examined] : right foot ~C was examined [Left Foot Was Examined] : left foot ~C was examined [Normal] : normal [Full ROM] : with full range of motion [Diminished Throughout Both Feet] : normal tactile sensation with monofilament testing throughout both feet [No Motor Deficits] : the motor exam was normal [No Tremors] : no tremors [Oriented x3] : oriented to person, place, and time [Normal Insight/Judgement] : insight and judgment were intact [Normal Mood] : the mood was normal

## 2020-01-27 NOTE — REVIEW OF SYSTEMS
[Hair Loss] : hair loss [Dry Skin] : dry skin [Anxiety] : anxiety [Cold Intolerance] : cold intolerant [Fatigue] : no fatigue [Decreased Appetite] : appetite not decreased [Recent Weight Gain (___ Lbs)] : no recent weight gain [Recent Weight Loss (___ Lbs)] : no recent weight loss [Visual Field Defect] : no visual field defect [Blurry Vision] : no blurred vision [Dysphagia] : no dysphagia [Dysphonia] : no dysphonia [Neck Pain] : no neck pain [Chest Pain] : no chest pain [Palpitations] : no palpitations [Constipation] : no constipation [Diarrhea] : no diarrhea [Polyuria] : no polyuria [Dysuria] : no dysuria [Headache] : no headaches [Tremors] : no tremors [Depression] : no depression [Polydipsia] : no polydipsia [Heat Intolerance] : heat tolerant [Easy Bruising] : no tendency for easy bruising [Swelling] : no swelling [FreeTextEntry2] : weight stable

## 2020-01-27 NOTE — REVIEW OF SYSTEMS
Pt notified that Dr. Araiza spoke to radiologist directly and confirmed presence of stool back up. Pt is to continue with plan as directed with miralax, pushing water and increasing fiber in diet. He is welcome to call if needs anything further.     Pt verbalized understanding.   [As Noted in HPI] : as noted in HPI [Negative] : Heme/Lymph

## 2020-02-01 ENCOUNTER — LABORATORY RESULT (OUTPATIENT)
Age: 50
End: 2020-02-01

## 2020-04-25 ENCOUNTER — MESSAGE (OUTPATIENT)
Age: 50
End: 2020-04-25

## 2020-05-03 LAB
SARS-COV-2 IGG SERPL IA-ACNC: <0.1 INDEX
SARS-COV-2 IGG SERPL QL IA: NEGATIVE

## 2020-08-15 ENCOUNTER — LABORATORY RESULT (OUTPATIENT)
Age: 50
End: 2020-08-15

## 2020-08-19 ENCOUNTER — APPOINTMENT (OUTPATIENT)
Dept: ENDOCRINOLOGY | Facility: CLINIC | Age: 50
End: 2020-08-19
Payer: COMMERCIAL

## 2020-08-19 VITALS
HEIGHT: 61 IN | WEIGHT: 209 LBS | HEART RATE: 91 BPM | OXYGEN SATURATION: 98 % | DIASTOLIC BLOOD PRESSURE: 78 MMHG | SYSTOLIC BLOOD PRESSURE: 120 MMHG | BODY MASS INDEX: 39.46 KG/M2

## 2020-08-19 LAB — GLUCOSE BLDC GLUCOMTR-MCNC: 102

## 2020-08-19 PROCEDURE — 99214 OFFICE O/P EST MOD 30 MIN: CPT | Mod: 25

## 2020-08-19 PROCEDURE — 82962 GLUCOSE BLOOD TEST: CPT

## 2020-08-19 RX ORDER — LANCETS
EACH MISCELLANEOUS
Qty: 300 | Refills: 0 | Status: ACTIVE | COMMUNITY
Start: 2019-08-23 | End: 1900-01-01

## 2020-08-19 RX ORDER — BLOOD SUGAR DIAGNOSTIC
STRIP MISCELLANEOUS
Qty: 300 | Refills: 1 | Status: ACTIVE | COMMUNITY
Start: 2019-08-23 | End: 1900-01-01

## 2020-08-19 NOTE — ASSESSMENT
[FreeTextEntry1] : 49 y/o female with Type 2 DM, Elevated LDL, Vitamin D Deficiency, and Hypothyroidism.\par \par Plan: \par Type 2 DM: controlled\par - continue current medication regimen: Trulicity 1.5 mg weekly\par - start self blood glucose monitoring \par - educated on healthy food choices - encouraged to restart Weight Watchers \par - encouraged to increase routine exercise\par - educated on the importance of routine retinopathy screening \par - follow up with podiatry and neurology due to numbness in left 1st toe\par \par Hypothyroidism: euthyroid on replacement\par - continue current dose of Levothyroxine 125 mcg daily\par \par Elevated LDL- follow fat diet, may benefit from a statin in the future \par \par Vitamin D Deficiency: low - start Vitamin 2000 units daily \par \par Follow up with cardiology/PCP due to palpitations with anxiety \par \par Labs and follow up in 4 months. [Importance of Diet and Exercise] : importance of diet and exercise to improve glycemic control, achieve weight loss and improve cardiovascular health

## 2020-08-19 NOTE — PHYSICAL EXAM
[Alert] : alert [Well Nourished] : well nourished [No Acute Distress] : no acute distress [Well Developed] : well developed [Normal Sclera/Conjunctiva] : normal sclera/conjunctiva [EOMI] : extra ocular movement intact [No LAD] : no lymphadenopathy [Thyroid Not Enlarged] : the thyroid was not enlarged [No Respiratory Distress] : no respiratory distress [No Thyroid Nodules] : no palpable thyroid nodules [Clear to Auscultation] : lungs were clear to auscultation bilaterally [Normal Rate and Effort] : normal respiratory rate and effort [Regular Rhythm] : with a regular rhythm [Normal S1, S2] : normal S1 and S2 [Normal Rate] : heart rate was normal [No Edema] : no peripheral edema [Normal Bowel Sounds] : normal bowel sounds [Not Tender] : non-tender [No Stigmata of Cushings Syndrome] : no stigmata of Cushings Syndrome [Soft] : abdomen soft [Normal Gait] : normal gait [No Rash] : no rash [Foot Ulcers] : no foot ulcers [Acanthosis Nigricans] : no acanthosis nigricans [Right Foot Was Examined] : right foot ~C was examined [Left Foot Was Examined] : left foot ~C was examined [Normal] : normal [Diminished Throughout Both Feet] : normal tactile sensation with monofilament testing throughout both feet [No Tremors] : no tremors [Normal Insight/Judgement] : insight and judgment were intact [Oriented x3] : oriented to person, place, and time [Normal Mood] : the mood was normal

## 2020-08-19 NOTE — HISTORY OF PRESENT ILLNESS
[FreeTextEntry1] : Pt. c/o of numbness at times in her left 1st toe. Reports in the past she has a pinched nerve in her back. \par \par Quality: Type 2 DM\par Severity: controlled\par Duration: a couple years ago\par Onset: GDM 19 years ago, routine labs\par Modifying Factors: Better with medication\par Associated Symptoms: neuropathy \par \par Current Regimen:\par Trulicity 1.5 mg weekly - no issues \par \par -Levothyroxine 125 mcg daily- taking appropriately \par \par Self blood sugar monitoring: sporadically, no meter, per logs\par no hypoglycemic symptoms\par BG  102 today in office \par \par exercise: none\par \par Diet: stopped Weight watchers, will get started again soon\par B- English muffin with peanut butter or ham and cheese, coffee with some sugar and milk \par L- brown rice with chicken\par D- steak, varies\par Snacks- fruit, gluten free pretzels  \par \par Date of last eye exam: 2 years ago  (-) diabetic retinopathy, needs appointment\par Date of last foot exam: sporadically, + neuropathy \par Date of last flu vaccine: 2019\par Date of last Pneumovax: no

## 2020-10-30 ENCOUNTER — TRANSCRIPTION ENCOUNTER (OUTPATIENT)
Age: 50
End: 2020-10-30

## 2020-12-01 ENCOUNTER — APPOINTMENT (OUTPATIENT)
Dept: ULTRASOUND IMAGING | Facility: CLINIC | Age: 50
End: 2020-12-01
Payer: COMMERCIAL

## 2020-12-01 ENCOUNTER — OUTPATIENT (OUTPATIENT)
Dept: OUTPATIENT SERVICES | Facility: HOSPITAL | Age: 50
LOS: 1 days | End: 2020-12-01
Payer: COMMERCIAL

## 2020-12-01 ENCOUNTER — APPOINTMENT (OUTPATIENT)
Dept: MAMMOGRAPHY | Facility: CLINIC | Age: 50
End: 2020-12-01
Payer: COMMERCIAL

## 2020-12-01 DIAGNOSIS — N60.19 DIFFUSE CYSTIC MASTOPATHY OF UNSPECIFIED BREAST: ICD-10-CM

## 2020-12-01 DIAGNOSIS — E78.2 MIXED HYPERLIPIDEMIA: ICD-10-CM

## 2020-12-01 DIAGNOSIS — Z00.8 ENCOUNTER FOR OTHER GENERAL EXAMINATION: ICD-10-CM

## 2020-12-01 DIAGNOSIS — Z82.49 FAMILY HISTORY OF ISCHEMIC HEART DISEASE AND OTHER DISEASES OF THE CIRCULATORY SYSTEM: ICD-10-CM

## 2020-12-01 DIAGNOSIS — D48.9 NEOPLASM OF UNCERTAIN BEHAVIOR, UNSPECIFIED: Chronic | ICD-10-CM

## 2020-12-01 DIAGNOSIS — Z98.89 OTHER SPECIFIED POSTPROCEDURAL STATES: Chronic | ICD-10-CM

## 2020-12-01 DIAGNOSIS — D17.1 BENIGN LIPOMATOUS NEOPLASM OF SKIN AND SUBCUTANEOUS TISSUE OF TRUNK: Chronic | ICD-10-CM

## 2020-12-01 PROCEDURE — 76641 ULTRASOUND BREAST COMPLETE: CPT | Mod: 26,50

## 2020-12-01 PROCEDURE — 77067 SCR MAMMO BI INCL CAD: CPT

## 2020-12-01 PROCEDURE — 77063 BREAST TOMOSYNTHESIS BI: CPT | Mod: 26

## 2020-12-01 PROCEDURE — 93880 EXTRACRANIAL BILAT STUDY: CPT | Mod: 26

## 2020-12-01 PROCEDURE — 77067 SCR MAMMO BI INCL CAD: CPT | Mod: 26

## 2020-12-01 PROCEDURE — 93880 EXTRACRANIAL BILAT STUDY: CPT

## 2020-12-01 PROCEDURE — 76641 ULTRASOUND BREAST COMPLETE: CPT

## 2020-12-01 PROCEDURE — 77063 BREAST TOMOSYNTHESIS BI: CPT

## 2020-12-18 ENCOUNTER — APPOINTMENT (OUTPATIENT)
Dept: ENDOCRINOLOGY | Facility: CLINIC | Age: 50
End: 2020-12-18

## 2021-03-10 ENCOUNTER — RX RENEWAL (OUTPATIENT)
Age: 51
End: 2021-03-10

## 2021-04-24 ENCOUNTER — LABORATORY RESULT (OUTPATIENT)
Age: 51
End: 2021-04-24

## 2021-04-26 ENCOUNTER — NON-APPOINTMENT (OUTPATIENT)
Age: 51
End: 2021-04-26

## 2021-06-04 ENCOUNTER — NON-APPOINTMENT (OUTPATIENT)
Age: 51
End: 2021-06-04

## 2021-06-08 ENCOUNTER — APPOINTMENT (OUTPATIENT)
Dept: ENDOCRINOLOGY | Facility: CLINIC | Age: 51
End: 2021-06-08
Payer: COMMERCIAL

## 2021-06-08 VITALS
DIASTOLIC BLOOD PRESSURE: 80 MMHG | SYSTOLIC BLOOD PRESSURE: 120 MMHG | HEIGHT: 61 IN | WEIGHT: 217 LBS | BODY MASS INDEX: 40.97 KG/M2 | HEART RATE: 96 BPM

## 2021-06-08 LAB — GLUCOSE BLDC GLUCOMTR-MCNC: 135

## 2021-06-08 PROCEDURE — 99214 OFFICE O/P EST MOD 30 MIN: CPT | Mod: 25

## 2021-06-08 PROCEDURE — 99072 ADDL SUPL MATRL&STAF TM PHE: CPT

## 2021-06-08 PROCEDURE — 82962 GLUCOSE BLOOD TEST: CPT

## 2021-06-17 ENCOUNTER — RX RENEWAL (OUTPATIENT)
Age: 51
End: 2021-06-17

## 2021-06-21 NOTE — PHYSICAL EXAM
[Alert] : alert [Well Nourished] : well nourished [No Acute Distress] : no acute distress [Well Developed] : well developed [Normal Sclera/Conjunctiva] : normal sclera/conjunctiva [EOMI] : extra ocular movement intact [No LAD] : no lymphadenopathy [Thyroid Not Enlarged] : the thyroid was not enlarged [No Thyroid Nodules] : no palpable thyroid nodules [No Respiratory Distress] : no respiratory distress [Normal Rate and Effort] : normal respiratory rate and effort [Clear to Auscultation] : lungs were clear to auscultation bilaterally [Normal S1, S2] : normal S1 and S2 [Normal Rate] : heart rate was normal [Regular Rhythm] : with a regular rhythm [No Edema] : no peripheral edema [No Stigmata of Cushings Syndrome] : no stigmata of Cushings Syndrome [Normal Gait] : normal gait [No Rash] : no rash [Right Foot Was Examined] : right foot ~C was examined [Left Foot Was Examined] : left foot ~C was examined [Normal] : normal [No Tremors] : no tremors [Oriented x3] : oriented to person, place, and time [Normal Insight/Judgement] : insight and judgment were intact [Normal Mood] : the mood was normal [Acanthosis Nigricans] : no acanthosis nigricans [Foot Ulcers] : no foot ulcers [Diminished Throughout Both Feet] : normal tactile sensation with monofilament testing throughout both feet

## 2021-06-21 NOTE — HISTORY OF PRESENT ILLNESS
[FreeTextEntry1] : Pt here for follow up T2DM\par  Hypothryoidsm\par   Lot of stress sister  suddenly in 2021 \par Pt  and family in shock but coming around now \par \par Quality: Type 2 DM\par Severity: controlled\par Duration: a couple years ago\par Onset: GDM 19 years ago, routine labs\par Modifying Factors: Better with medication\par Associated Symptoms: neuropathy \par \par Current Regimen:\par Trulicity 1.5 mg weekly - no issues \par \par -Levothyroxine 125 mcg daily- taking appropriately \par \par Self blood sugar monitoring: -110 \par   \par  by report \par no hypoglycemic symptoms\par \par \par exercise: none\par \par Diet: stopped Weight watchers, will get started again soon\par B- English muffin with peanut butter or ham and cheese, coffee with some sugar and milk \par L- brown rice with chicken\par D- steak, varies\par Snacks- fruit, gluten free pretzels  \par \par Date of last eye exam: 2 years ago  (-) diabetic retinopathy, needs appointment\par Date of last foot exam: sporadically, + neuropathy \par Date of last flu vaccine: \par Date of last Pneumovax: no

## 2021-06-21 NOTE — REVIEW OF SYSTEMS
[Palpitations] : palpitations [Hair Loss] : hair loss [Anxiety] : anxiety [Fatigue] : no fatigue [Decreased Appetite] : appetite not decreased [Recent Weight Gain (___ Lbs)] : no recent weight gain [Recent Weight Loss (___ Lbs)] : no recent weight loss [Visual Field Defect] : no visual field defect [Dysphagia] : no dysphagia [Blurred Vision] : no blurred vision [Neck Pain] : no neck pain [Dysphonia] : no dysphonia [Chest Pain] : no chest pain [Constipation] : no constipation [Diarrhea] : no diarrhea [Polyuria] : no polyuria [Dysuria] : no dysuria [Dry Skin] : no dry skin [Headaches] : no headaches [Tremors] : no tremors [Depression] : no depression [Polydipsia] : no polydipsia [Cold Intolerance] : no cold intolerance [Heat Intolerance] : no heat intolerance [Swelling] : no swelling [FreeTextEntry2] : weight stable  [FreeTextEntry5] : with anxiety

## 2021-07-15 ENCOUNTER — TRANSCRIPTION ENCOUNTER (OUTPATIENT)
Age: 51
End: 2021-07-15

## 2021-10-15 ENCOUNTER — RX RENEWAL (OUTPATIENT)
Age: 51
End: 2021-10-15

## 2021-11-29 ENCOUNTER — LABORATORY RESULT (OUTPATIENT)
Age: 51
End: 2021-11-29

## 2021-11-29 ENCOUNTER — APPOINTMENT (OUTPATIENT)
Dept: ENDOCRINOLOGY | Facility: CLINIC | Age: 51
End: 2021-11-29
Payer: COMMERCIAL

## 2021-11-29 VITALS
SYSTOLIC BLOOD PRESSURE: 122 MMHG | BODY MASS INDEX: 42.29 KG/M2 | HEART RATE: 98 BPM | WEIGHT: 224 LBS | DIASTOLIC BLOOD PRESSURE: 86 MMHG | HEIGHT: 61 IN

## 2021-11-29 LAB — GLUCOSE BLDC GLUCOMTR-MCNC: 102

## 2021-11-29 PROCEDURE — 82962 GLUCOSE BLOOD TEST: CPT

## 2021-11-29 PROCEDURE — 36415 COLL VENOUS BLD VENIPUNCTURE: CPT

## 2021-11-29 PROCEDURE — 99214 OFFICE O/P EST MOD 30 MIN: CPT | Mod: 25

## 2021-11-29 NOTE — REVIEW OF SYSTEMS
[Cough] : cough [Heartburn] : heartburn [Joint Pain] : joint pain [Dry Skin] : dry skin [Anxiety] : anxiety [Negative] : Eyes [Fatigue] : no fatigue [Decreased Appetite] : appetite not decreased [Recent Weight Gain (___ Lbs)] : no recent weight gain [Recent Weight Loss (___ Lbs)] : no recent weight loss [Fever] : no fever [Chills] : no chills [Dysphagia] : no dysphagia [Neck Pain] : no neck pain [Dysphonia] : no dysphonia [Chest Pain] : no chest pain [Palpitations] : no palpitations [Shortness Of Breath] : no shortness of breath [Nausea] : no nausea [Constipation] : no constipation [Vomiting] : no vomiting [Diarrhea] : no diarrhea [Polyuria] : no polyuria [Dysuria] : no dysuria [Muscle Weakness] : no muscle weakness [Hair Loss] : no hair loss [Headaches] : no headaches [Tremors] : no tremors [Depression] : no depression [Polydipsia] : no polydipsia [Cold Intolerance] : no cold intolerance [Heat Intolerance] : no heat intolerance [Easy Bleeding] : no ~M tendency for easy bleeding [Easy Bruising] : no tendency for easy bruising [FreeTextEntry6] : cough not new  [FreeTextEntry9] : Arthritis  [de-identified] : with the cold  [de-identified] : not overwhelming

## 2021-11-29 NOTE — HISTORY OF PRESENT ILLNESS
[FreeTextEntry1] : Pt here for follow up T2DM\par Hypothyroidism\par Lot of stress sister  suddenly in 2021 \par Pt  and family in shock but coming around now \par \par Quality: Type 2 DM\par Severity: controlled\par Duration: a couple years ago\par Onset: GDM 19 years ago, routine labs\par Modifying Factors: Better with medication\par Associated Symptoms: neuropathy \par \par Current Regimen:\par Trulicity 1.5 mg weekly - no issues \par \par -Levothyroxine 125 mcg daily- taking appropriately \par \par Self blood sugar monitoring:  Has not tested in the last two weeks due to unable to find meter\par no hypoglycemic symptoms\par Current \par \par \par exercise: none\par \par Diet: stopped Weight watchers, will get started again soon\par B- English muffin with peanut butter or ham and cheese, coffee with some sugar and milk \par L- brown rice with chicken\par D- steak, varies\par Snacks- fruit, gluten free pretzels  \par \par Date of last eye exam: 2 years ago  (-) diabetic retinopathy, needs appointment\par Date of last foot exam: sporadically\par Date of last flu vaccine: \par Date of last Pneumovax: no\par COVID vaccine

## 2021-11-29 NOTE — ASSESSMENT
[FreeTextEntry1] : 52 y/o female with Type 2 DM, Elevated LDL, Vitamin D Deficiency, and Hypothyroidism.\par \par Plan: \par Type 2 DM: glycemic control is unknown due to no glucose monitoring and no recent A1C, check labs today in office\par - continue current medication regimen: Trulicity 1.5 mg weekly \par - educated on healthy food choices \par - encouraged to increase routine exercise\par - educated on the importance of routine retinopathy screening \par \par Hypothyroidism: on replacement, check TFTs now in office \par - continue current dose of Levothyroxine 125 mcg daily\par \par Elevated LDL- check lipids in office, follow fat diet, may benefit from a statin in the future \par \par Vitamin D Deficiency: check levels now- continue Vitamin 2000 units daily \par \par psych- support given for sister's death - will consider counselling \par \par Follow up in 3 months. [Levothyroxine] : The patient was instructed to take Levothyroxine on an empty stomach, separate from vitamins, and wait at least 30 minutes before eating

## 2021-11-29 NOTE — PHYSICAL EXAM
[Alert] : alert [Well Nourished] : well nourished [No Acute Distress] : no acute distress [Well Developed] : well developed [Normal Sclera/Conjunctiva] : normal sclera/conjunctiva [No Proptosis] : no proptosis [No LAD] : no lymphadenopathy [Thyroid Not Enlarged] : the thyroid was not enlarged [No Thyroid Nodules] : no palpable thyroid nodules [No Respiratory Distress] : no respiratory distress [No Accessory Muscle Use] : no accessory muscle use [Normal Rate and Effort] : normal respiratory rate and effort [Clear to Auscultation] : lungs were clear to auscultation bilaterally [Normal S1, S2] : normal S1 and S2 [Normal Rate] : heart rate was normal [Regular Rhythm] : with a regular rhythm [No Edema] : no peripheral edema [Normal Bowel Sounds] : normal bowel sounds [Not Tender] : non-tender [Soft] : abdomen soft [Normal Gait] : normal gait [No Rash] : no rash [Acanthosis Nigricans] : no acanthosis nigricans [No Tremors] : no tremors [Oriented x3] : oriented to person, place, and time [Normal Affect] : the affect was normal [Normal Insight/Judgement] : insight and judgment were intact [Normal Mood] : the mood was normal

## 2021-11-30 LAB
25(OH)D3 SERPL-MCNC: 23.4 NG/ML
ALBUMIN SERPL ELPH-MCNC: 4.4 G/DL
ALP BLD-CCNC: 90 U/L
ALT SERPL-CCNC: 18 U/L
ANION GAP SERPL CALC-SCNC: 13 MMOL/L
AST SERPL-CCNC: 17 U/L
BASOPHILS # BLD AUTO: 0.03 K/UL
BASOPHILS NFR BLD AUTO: 0.4 %
BILIRUB SERPL-MCNC: 0.2 MG/DL
BUN SERPL-MCNC: 15 MG/DL
CALCIUM SERPL-MCNC: 9.4 MG/DL
CHLORIDE SERPL-SCNC: 101 MMOL/L
CHOLEST SERPL-MCNC: 188 MG/DL
CO2 SERPL-SCNC: 24 MMOL/L
CREAT SERPL-MCNC: 0.67 MG/DL
CREAT SPEC-SCNC: 42 MG/DL
EOSINOPHIL # BLD AUTO: 0.12 K/UL
EOSINOPHIL NFR BLD AUTO: 1.5 %
ESTIMATED AVERAGE GLUCOSE: 126 MG/DL
GLUCOSE SERPL-MCNC: 93 MG/DL
HBA1C MFR BLD HPLC: 6 %
HCT VFR BLD CALC: 41.5 %
HDLC SERPL-MCNC: 58 MG/DL
HGB BLD-MCNC: 12.9 G/DL
IMM GRANULOCYTES NFR BLD AUTO: 0.2 %
LDLC SERPL CALC-MCNC: 105 MG/DL
LYMPHOCYTES # BLD AUTO: 2.71 K/UL
LYMPHOCYTES NFR BLD AUTO: 33 %
MAN DIFF?: NORMAL
MCHC RBC-ENTMCNC: 26.4 PG
MCHC RBC-ENTMCNC: 31.1 GM/DL
MCV RBC AUTO: 85 FL
MICROALBUMIN 24H UR DL<=1MG/L-MCNC: <1.2 MG/DL
MICROALBUMIN/CREAT 24H UR-RTO: NORMAL MG/G
MONOCYTES # BLD AUTO: 0.55 K/UL
MONOCYTES NFR BLD AUTO: 6.7 %
NEUTROPHILS # BLD AUTO: 4.77 K/UL
NEUTROPHILS NFR BLD AUTO: 58.2 %
NONHDLC SERPL-MCNC: 130 MG/DL
PLATELET # BLD AUTO: 298 K/UL
POTASSIUM SERPL-SCNC: 4.9 MMOL/L
PROT SERPL-MCNC: 7 G/DL
RBC # BLD: 4.88 M/UL
RBC # FLD: 13.5 %
SODIUM SERPL-SCNC: 137 MMOL/L
T3 SERPL-MCNC: 145 NG/DL
T3FREE SERPL-MCNC: 3.7 PG/ML
T3RU NFR SERPL: 1 TBI
T4 FREE SERPL-MCNC: 1.8 NG/DL
T4 SERPL-MCNC: 10.4 UG/DL
THYROGLOB AB SERPL-ACNC: <20 IU/ML
THYROPEROXIDASE AB SERPL IA-ACNC: 29.7 IU/ML
TRIGL SERPL-MCNC: 125 MG/DL
TSH SERPL-ACNC: 0.58 UIU/ML
VIT B12 SERPL-MCNC: 540 PG/ML
WBC # FLD AUTO: 8.2 K/UL

## 2021-12-02 ENCOUNTER — NON-APPOINTMENT (OUTPATIENT)
Age: 51
End: 2021-12-02

## 2022-02-23 ENCOUNTER — APPOINTMENT (OUTPATIENT)
Dept: ENDOCRINOLOGY | Facility: CLINIC | Age: 52
End: 2022-02-23

## 2022-04-04 ENCOUNTER — TRANSCRIPTION ENCOUNTER (OUTPATIENT)
Age: 52
End: 2022-04-04

## 2022-04-24 LAB
25(OH)D3 SERPL-MCNC: 25.1 NG/ML
ALBUMIN SERPL ELPH-MCNC: 4.5 G/DL
ALP BLD-CCNC: 94 U/L
ALT SERPL-CCNC: 14 U/L
ANION GAP SERPL CALC-SCNC: 10 MMOL/L
AST SERPL-CCNC: 15 U/L
BASOPHILS # BLD AUTO: 0.04 K/UL
BASOPHILS NFR BLD AUTO: 0.6 %
BILIRUB SERPL-MCNC: 0.4 MG/DL
BUN SERPL-MCNC: 16 MG/DL
CALCIUM SERPL-MCNC: 9.8 MG/DL
CHLORIDE SERPL-SCNC: 107 MMOL/L
CHOLEST SERPL-MCNC: 168 MG/DL
CO2 SERPL-SCNC: 26 MMOL/L
CREAT SERPL-MCNC: 0.78 MG/DL
CREAT SPEC-SCNC: 122 MG/DL
EGFR: 91 ML/MIN/1.73M2
EOSINOPHIL # BLD AUTO: 0.14 K/UL
EOSINOPHIL NFR BLD AUTO: 2 %
ESTIMATED AVERAGE GLUCOSE: 134 MG/DL
GLUCOSE SERPL-MCNC: 101 MG/DL
HBA1C MFR BLD HPLC: 6.3 %
HCT VFR BLD CALC: 42.5 %
HDLC SERPL-MCNC: 59 MG/DL
HGB BLD-MCNC: 13.2 G/DL
IMM GRANULOCYTES NFR BLD AUTO: 0.3 %
LDLC SERPL CALC-MCNC: 93 MG/DL
LYMPHOCYTES # BLD AUTO: 2.45 K/UL
LYMPHOCYTES NFR BLD AUTO: 34.3 %
MAN DIFF?: NORMAL
MCHC RBC-ENTMCNC: 27.2 PG
MCHC RBC-ENTMCNC: 31.1 GM/DL
MCV RBC AUTO: 87.4 FL
MICROALBUMIN 24H UR DL<=1MG/L-MCNC: <1.2 MG/DL
MICROALBUMIN/CREAT 24H UR-RTO: NORMAL MG/G
MONOCYTES # BLD AUTO: 0.5 K/UL
MONOCYTES NFR BLD AUTO: 7 %
NEUTROPHILS # BLD AUTO: 3.99 K/UL
NEUTROPHILS NFR BLD AUTO: 55.8 %
NONHDLC SERPL-MCNC: 109 MG/DL
PLATELET # BLD AUTO: 300 K/UL
POTASSIUM SERPL-SCNC: 5.4 MMOL/L
PROT SERPL-MCNC: 7 G/DL
RBC # BLD: 4.86 M/UL
RBC # FLD: 13.9 %
SODIUM SERPL-SCNC: 143 MMOL/L
T4 FREE SERPL-MCNC: 1.8 NG/DL
TRIGL SERPL-MCNC: 82 MG/DL
TSH SERPL-ACNC: 2.09 UIU/ML
VIT B12 SERPL-MCNC: 506 PG/ML
WBC # FLD AUTO: 7.14 K/UL

## 2022-04-27 ENCOUNTER — APPOINTMENT (OUTPATIENT)
Dept: ENDOCRINOLOGY | Facility: CLINIC | Age: 52
End: 2022-04-27
Payer: COMMERCIAL

## 2022-04-27 PROCEDURE — 99214 OFFICE O/P EST MOD 30 MIN: CPT | Mod: 95

## 2022-04-27 NOTE — ASSESSMENT
[FreeTextEntry1] : 52 y/o female with Type 2 DM, Elevated LDL, Vitamin D Deficiency, and Hypothyroidism.\par \par Plan: \par Type 2 DM: glycemic control is unknown due to no glucose monitoring and no recent A1C, check labs today in office\par - Increase current medication regimen to Trulicity 3 mg weekly \par - educated on healthy food choices \par - encouraged to increase routine exercise\par - educated on the importance of routine retinopathy screening \par \par Hypothyroidism: on replacement, levels stable \par - continue current dose of Levothyroxine 125 mcg daily\par \par HLD - Choelsterol has improved. Increased with diet and exercise efforts \par \par Vitamin D Deficiency: continue Vitamin 2000 units daily , be more consistent \par \par psych- support given for sister's death - will consider counselling \par \par Follow up in 3 months. \par \par \par

## 2022-04-27 NOTE — REVIEW OF SYSTEMS
[Fatigue] : fatigue [Recent Weight Gain (___ Lbs)] : no recent weight gain [Recent Weight Loss (___ Lbs)] : no recent weight loss [Visual Field Defect] : no visual field defect [Blurred Vision] : no blurred vision [Dysphagia] : no dysphagia [Neck Pain] : no neck pain [Chest Pain] : no chest pain [Palpitations] : no palpitations [Nausea] : no nausea [Constipation] : no constipation [Vomiting] : no vomiting [Diarrhea] : no diarrhea [Polyuria] : no polyuria [Irregular Menses] : regular menses [Polydipsia] : no polydipsia

## 2022-04-27 NOTE — REASON FOR VISIT
[Follow - Up] : a follow-up visit [DM Type 2] : DM Type 2 [Hypothyroidism] : hypothyroidism [Other___] : [unfilled] [Home] : at home, [unfilled] , at the time of the visit. [Other Location: e.g. Home (Enter Location, City,State)___] : at [unfilled] [Verbal consent obtained from patient] : the patient, [unfilled]

## 2022-04-27 NOTE — HISTORY OF PRESENT ILLNESS
[FreeTextEntry1] : \par Hypothyroidism\par Lot of stress sister  suddenly in 2021 and now her aunt  last month \par \par \par Quality: Type 2 DM\par Severity: controlled\par Duration: a couple years ago\par Onset: GDM 19 years ago, routine labs\par Modifying Factors: Better with medication\par Associated Symptoms: neuropathy \par \par Current Regimen:\par Trulicity 1.5 mg weekly - no issues \par \par -Levothyroxine 125 mcg daily- taking appropriately \par \par Self blood sugar monitoring: Has not tested in the last two weeks due to unable to find meter\par no hypoglycemic symptoms\par Current  right now, salad about 1.5 hours ago \par \par Weight: stable \par \par exercise: none, knee has been bothering her \par \par Diet: stopped Weight watchers, will get started again soon - not has been the best \par B- English muffin with peanut butter or ham and cheese, coffee with some sugar and milk \par L- brown rice with chicken\par D- steak, varies\par Snacks- fruit, gluten free pretzels \par \par Date of last eye exam: 2 years ago (-) diabetic retinopathy, needs appointment, over due \par Date of last foot exam: sporadically\par Date of last flu vaccine: \par Date of last Pneumovax: no\par COVID vaccine \par

## 2022-05-09 ENCOUNTER — APPOINTMENT (OUTPATIENT)
Dept: OPHTHALMOLOGY | Facility: CLINIC | Age: 52
End: 2022-05-09
Payer: COMMERCIAL

## 2022-05-09 ENCOUNTER — NON-APPOINTMENT (OUTPATIENT)
Age: 52
End: 2022-05-09

## 2022-05-09 PROCEDURE — 92004 COMPRE OPH EXAM NEW PT 1/>: CPT

## 2022-05-25 ENCOUNTER — EMERGENCY (EMERGENCY)
Facility: HOSPITAL | Age: 52
LOS: 1 days | Discharge: HOME | End: 2022-05-25
Attending: EMERGENCY MEDICINE
Payer: COMMERCIAL

## 2022-05-25 VITALS
RESPIRATION RATE: 16 BRPM | HEIGHT: 62 IN | OXYGEN SATURATION: 97 % | TEMPERATURE: 98 F | WEIGHT: 160.06 LBS | HEART RATE: 93 BPM

## 2022-05-25 DIAGNOSIS — D17.1 BENIGN LIPOMATOUS NEOPLASM OF SKIN AND SUBCUTANEOUS TISSUE OF TRUNK: Chronic | ICD-10-CM

## 2022-05-25 DIAGNOSIS — Z98.89 OTHER SPECIFIED POSTPROCEDURAL STATES: Chronic | ICD-10-CM

## 2022-05-25 DIAGNOSIS — D48.9 NEOPLASM OF UNCERTAIN BEHAVIOR, UNSPECIFIED: Chronic | ICD-10-CM

## 2022-05-25 PROCEDURE — 99284 EMERGENCY DEPT VISIT MOD MDM: CPT

## 2022-05-25 PROCEDURE — 99283 EMERGENCY DEPT VISIT LOW MDM: CPT

## 2022-05-25 PROCEDURE — 73564 X-RAY EXAM KNEE 4 OR MORE: CPT

## 2022-05-25 PROCEDURE — 99284 EMERGENCY DEPT VISIT MOD MDM: CPT | Mod: 25

## 2022-05-25 PROCEDURE — 73564 X-RAY EXAM KNEE 4 OR MORE: CPT | Mod: 26,RT

## 2022-05-25 RX ORDER — IBUPROFEN 200 MG
600 TABLET ORAL ONCE
Refills: 0 | Status: COMPLETED | OUTPATIENT
Start: 2022-05-25 | End: 2022-05-25

## 2022-05-25 RX ORDER — OXYCODONE AND ACETAMINOPHEN 5; 325 MG/1; MG/1
1 TABLET ORAL ONCE
Refills: 0 | Status: COMPLETED | OUTPATIENT
Start: 2022-05-25 | End: 2022-05-25

## 2022-05-25 RX ADMIN — Medication 600 MILLIGRAM(S): at 20:38

## 2022-05-25 NOTE — ED PROVIDER NOTE - PATIENT PORTAL LINK FT
You can access the FollowMyHealth Patient Portal offered by [facility] by registering at the following website: [email or web address]. By joining [facility] FollowMyHealth portal, you will also be able to view your health information using other applications (apps) compatible with our system.

## 2022-05-25 NOTE — ED PROVIDER NOTE - PHYSICAL EXAMINATION
Const: Awake, alert and oriented. In no acute distress. Well appearing.  HEENT: NC/AT. Moist mucous membranes.  Eyes: No scleral icterus. EOMI.  Neck:. Soft and supple. Full ROM without pain.  Cardiac:  +S1/S2. No murmurs. Peripheral pulses 2+ and symmetric. No LE edema.  Resp: Speaking in full sentences. No evidence of respiratory distress. No wheezes, rales or rhonchi.  Abd: Soft, non-tender, non-distended. Normal bowel sounds in all 4 quadrants. No guarding or rebound.  MSK: Tenderness over right patella on palpation FROM in all extremities neurovascularly intact DP palpable   Back: Spine midline and non-tender. No CVAT.  Skin: No rashes, abrasions or lacerations.  Lymph: No cervical lymphadenopathy.  Neuro: Awake, alert & oriented x 3. Moves all extremities symmetrically.

## 2022-05-25 NOTE — CONSULT NOTE ADULT - SUBJECTIVE AND OBJECTIVE BOX
Pt Name: VERONICA ZAMBRANO    MRN: [id number]      Patient is a 52y Female presenting to the emergency department with a chief complaint of right knee pain x 2 weeks, significantly worse today prompting visit to ED today. Unsure of what started the pain, but notes being very active. Denies any trauma or falls Reports history of instability but denies any prior patella dislocations. 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 Unsure of which orthopedist she was following with but thinks possibly Dr. Campos. Patient reports increased pain with walking or externally rotating foot. Used a knee wrap which helped initially but not today. States she was walking a lot today. Denies numbness or tingling, No other orthopedic complaints.      REVIEW OF SYSTEMS    General: Alert, responsive, in NAD    Skin/Breast: No rashes, no pruritis     Respiratory and Thorax: No difficulty breathing. No cough.  	   Cardiovascular:	No chest pain. No palpitations.    Gastrointestinal:	 No abdominal pain. No diarrhea.     Musculoskeletal: SEE HPI.    Neurological: No sensory or motor changes.     Hematology/Lymphatics: No swelling.    ROS is otherwise negative.      PAST MEDICAL & SURGICAL HISTORY:  Gastritis      Asthma      Hypothyroid      Pericarditis      S/P  section      Lipoma of back      Tumors  Rt Hand          Allergies: Envionmental, Animals (Rash)  No Known Drug Allergies      Medications: oxycodone    5 mG/acetaminophen 325 mG 1 Tablet(s) Oral once      FAMILY HISTORY:  : non-contributory    Social History:     Ambulation: Walking independently [X] With Cane [ ] With Walker [ ]  Bedbound [ ]         Vital Signs Last 24 Hrs  T(C): 36.6 (25 May 2022 19:37), Max: 36.6 (25 May 2022 19:37)  T(F): 97.9 (25 May 2022 19:37), Max: 97.9 (25 May 2022 19:37)  HR: 93 (25 May 2022 19:37) (93 - 93)  BP: --  BP(mean): --  RR: 16 (25 May 2022 19:37) (16 - 16)  SpO2: 97% (25 May 2022 19:37) (97% - 97%)    Daily Height in cm: 157.48 (25 May 2022 19:37)    Daily       PHYSICAL EXAM:    Appearance: Alert, responsive, in no acute distress.    Musculoskeletal:       Right Lower Extremity: +TTP over lateral patella and knee. +patella instability translates laterally when medial pressure removed. Skin is clean, dry, and intact. No abrasions, ecchymosis, or erythema. No bleeding. Sensation is grossly intact to light touch. + KF/KE/dorsi/plantarflexion/EHL/FHL. DP pulse 2+. Cap refill < 2 seconds. No cyanosis. No signs of venous insufficiency or stasis.       Imaging Studies:  xray right knee: official reads pending, patella subluxation laterally        A/P:  Pt is a  52y Female with right patella instability    PLAN:   * ACE wrap, KI placed to right knee  * WBAT  * pain control, NSAIDs if able to tolerate, ice, elevation  * patient can follow-up in office with Dr. Kim or original orthopedist she was seeing for left knee

## 2022-05-25 NOTE — ED PROVIDER NOTE - NS ED ATTENDING STATEMENT MOD
This was a shared visit with the TANO. I reviewed and verified the documentation and independently performed the documented:

## 2022-05-25 NOTE — ED PROVIDER NOTE - OBJECTIVE STATEMENT
pt is a 51 y/o female presenting to the ed for evaluation for right knee pain for the past two weeks. pt states no injuries or trauma to the area. pt states feels that "something is moving in her knee", pt denies cp sob fever abd pain back pain numbness or loss of sensation abrasions or lacerations

## 2022-05-25 NOTE — ED PROVIDER NOTE - CARE PROVIDER_API CALL
James Corey (DO)  Orthopedics  [address] [place] [id number]  Phone: [phone]  Fax: [phone]  Follow Up Time:

## 2022-05-25 NOTE — ED PROVIDER NOTE - ATTENDING APP SHARED VISIT CONTRIBUTION OF CARE
The patient seen and examined    Knee Pain    I, Sterling Bales, performed the initial face to face bedside interview with this patient regarding history of present illness, review of symptoms and relevant past medical, social and family history.  I completed an independent physical examination.  I was the initial provider who evaluated this patient. I have signed out the follow up of any pending tests (i.e. labs, radiological studies) to the ACP.  I have communicated the patient’s plan of care and disposition with the ACP.

## 2022-05-25 NOTE — ED PROVIDER NOTE - PROGRESS NOTE DETAILS
reviewed xray of knee - noticed patella sublux ortho consult  knee immbolizer crutches pt to follow up with ortho outpatient

## 2022-05-25 NOTE — ED ADULT NURSE NOTE - OBJECTIVE STATEMENT
Pt is alert and oriented. Pt states that she has been having pain in the front of her right knee for 2 weeks that comes and goes. Pt denies any falls or injury to the right knee. Pt denies sob, chest pain, nausea, vomiting, and dizziness. Pt resp are even and unlabored, skin color migdalia for race. Pt updated on plan of care.

## 2022-06-07 ENCOUNTER — APPOINTMENT (OUTPATIENT)
Dept: ORTHOPEDIC SURGERY | Facility: CLINIC | Age: 52
End: 2022-06-07
Payer: COMMERCIAL

## 2022-06-07 VITALS
WEIGHT: 215 LBS | HEIGHT: 61 IN | DIASTOLIC BLOOD PRESSURE: 87 MMHG | BODY MASS INDEX: 40.59 KG/M2 | HEART RATE: 88 BPM | SYSTOLIC BLOOD PRESSURE: 134 MMHG

## 2022-06-07 PROCEDURE — 73562 X-RAY EXAM OF KNEE 3: CPT | Mod: LT

## 2022-06-07 PROCEDURE — 99203 OFFICE O/P NEW LOW 30 MIN: CPT

## 2022-06-07 RX ORDER — MELOXICAM 15 MG/1
15 TABLET ORAL
Qty: 21 | Refills: 0 | Status: ACTIVE | COMMUNITY
Start: 2022-06-07 | End: 1900-01-01

## 2022-06-07 NOTE — PHYSICAL EXAM
[de-identified] : General:\par Awake, alert, no acute distress, Patient was cooperative and appropriate during the examination.\par \par The patient is overweight for height and age.\par \par Walks with a mildly antalgic gait on her right side.\par \par Full, painless range of motion of the neck and back.\par \par Exam of the bilateral lower extremities is intact and symmetric with regards to dermatologic, vascular, and neurologic exam. Bilateral lower extremity sensation is grossly intact to light touch in the DP/SP/T/S/S nerve distributions. Intact DF/PF/EHL. BIlateral lower extremity warm and well-perfused with brisk capillary refill.\par \par Pulmonary:\par Regular, nonlabored breathing\par \par Abdomen:\par Soft, nontender, nondistended.\par \par Lymphatic:\par No evidence of inguinal lymphadenopathy\par \par Bilateral Knee Examination:\par Physical examination of the knees demonstrates normal skin without signs of skin changes or abnormalities. No erythema, warmth, or joint effusion is appreciated.\par \par Sensation is intact to light touch L2-S1\par Palpable DP/PT pulse\par EHL/FHL/TA/GSC motor function intact\par \par Range of Motion\par 0 to 130 degrees bilaterally with pain at terminal flexion on the right side\par \par Strength Testing\par Quadriceps/Hamstrings 5/5 bilaterally\par Patient is able to perform a straight leg raise bilaterally without difficulty.\par \par Palpation\par Not tender to palpation about the distal femurs, proximal tibias\par Moderately tender to palpation about the right patellofemoral compartment\par No palpable defect appreciated in the quadriceps or patellar tendons\par Not tender to palpation of medial joint lines\par Not tender to palpation of lateral joint lines\par \par Special Tests\par Anterior Drawer negative bilaterally\par Posterior Drawer negative bilaterally\par Lachman Exam negative bilaterally\par No Varus or Valgus Laxity at 0 or 30 degrees of knee flexion bilaterally\par Kevin's Test negative bilaterally\par Active compression of the patella positive for pain and crepitus bilaterally\par Translation of the patella 2+ quadrants limited by patient apprehension and guarding bilaterally\par \par \par \par \par \par \par  [de-identified] : X-rays including multiple views of the right knee from a [facility] imaging facility were reviewed today in the office.  The patient has no acute fracture or dislocation.  She has advanced arthritic changes, particularly in the patellofemoral compartment related to chronic patellar instability.  She has lateral translation and subluxation of the patella with complete obliteration of the articular cartilage surfaces.  There is abundant osteophyte formation.  She also has patella adilene.  There is mild arthritis in the medial compartment with osteophyte formation as well.\par \par X-rays including 3 views of the left knee were obtained in the office today on 6/7/2022 and reviewed the patient.  There is no acute fracture or dislocation.  The patient has mild there are tricompartmental osteoarthritic changes.  She has patella adilene and mild patellar lateral translation.

## 2022-06-07 NOTE — DISCUSSION/SUMMARY
[de-identified] : Assessment: 52-year-old female with  severe right patellofemoral arthritis, mild tricompartmental left knee arthritis\par \par Plan: I had a long discussion with the patient today regarding the nature of their diagnosis and treatment plan. We discussed the risks and benefits of no treatment as well as nonoperative and operative treatments.  I reviewed the patient's x-rays today with her in the office which demonstrate severe arthritis of the patellofemoral compartment of the right knee and mild arthritis of the left knee.  At this time I recommend conservative treatment of the patient's condition with modalities including rest, ice, heat, anti-inflammatory medications, activity modifications, and home stretching and strengthening exercises daily.  A prescription for meloxicam was sent to her pharmacy today.  I discussed with the patient the risks and benefits associated with NSAID use.  The patient deferred any physical therapy at this time.  She is interested in a cortisone injection for her pain but has to work today.  She would like to schedule a follow-up appointment to receive a cortisone injection to the right knee.  She will do so today at the .\par \par The patient verbalizes their understanding and agrees with the plan.  All questions were answered to their satisfaction.

## 2022-06-07 NOTE — HISTORY OF PRESENT ILLNESS
[Pain Location] : pain [] : right knee [Worsening] : worsening [5] : a current pain level of 5/10 [8] : an average pain level of 8/10 [7] : a minimum pain level of 7/10 [9] : a maximum pain level of 9/10 [Constant] : ~He/She~ states the symptoms seem to be constant [Direct Pressure] : worsened by direct pressure [Walking] : worsened by walking [Knee Flexion] : worsened with knee flexion [Knee Extension] : worsened with knee extension [Rest] : relieved by rest [de-identified] : 6/7/2022: Cindy is a pleasant 52-year-old female who presents to the office today complaining of bilateral knee pain which is much worse on her right side.  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  She has not dislocated kneecap in many years but has had issues on and off with the knee over the past several years.  Her pain is activity related and alleviated by rest.  Hurts to bend down or squat.  Hurts to go up and down stairs.  Over the past couple of weeks her pain has become very severe and she had x-rays at her [facility] facility.  She comes in to discuss her treatment options with me today.  Of note, the patient has never dislocated her left patella.  The patient denies any fevers, chills, sweats, recent illnesses, numbness, tingling, weakness, or pain elsewhere at this time.\par 	\par She denies prior injury.\par She had a previous injury to \par She denies paresthesia.\par She has paresthesia in \par She denies night symptoms.\par She endorses night symptoms that wake her up.\par Symptoms worsen with activity.\par She is not diabetic.\par She is diabetic, and her most recent hemoglobin A1c level reportedly was % in of 2021\par She is diabetic, and cannot recall her most recent hemoglobin A1c level\par She has a history of gastric problems.\par

## 2022-06-07 NOTE — REVIEW OF SYSTEMS
[Joint Pain] : joint pain [Negative] : Heme/Lymph [FreeTextEntry9] : Bilateral knee pain, right worse than left

## 2022-06-07 NOTE — CONSULT LETTER
[Dear  ___] : Dear  [unfilled], [Consult Letter:] : I had the pleasure of evaluating your patient, [unfilled]. [( Thank you for referring [unfilled] for consultation for _____ )] : Thank you for referring [unfilled] for consultation for [unfilled] [Please see my note below.] : Please see my note below. [Consult Closing:] : Thank you very much for allowing me to participate in the care of this patient.  If you have any questions, please do not hesitate to contact me. [Sincerely,] : Sincerely, [FreeTextEntry2] : SIVAKUMAR CONDE\par  [FreeTextEntry3] : James Corey DO.\par Sports Medicine \par Orthopaedic Surgery\par \par [facility]\par [facility] \par [address] \[address] \[place] [address]\par \par Tel [phone]\par Fax [phone]\par \par For same day and next day orthopedic appointments contact:\par [email or web address] |[phone])\par Appointments available nights and weekends!  \par \par [facility]\par Visit us at [facility]/orthopaedic\par

## 2022-06-24 ENCOUNTER — APPOINTMENT (OUTPATIENT)
Dept: ORTHOPEDIC SURGERY | Facility: CLINIC | Age: 52
End: 2022-06-24
Payer: COMMERCIAL

## 2022-06-24 VITALS
BODY MASS INDEX: 40.59 KG/M2 | HEIGHT: 61 IN | WEIGHT: 215 LBS | DIASTOLIC BLOOD PRESSURE: 85 MMHG | HEART RATE: 103 BPM | SYSTOLIC BLOOD PRESSURE: 139 MMHG

## 2022-06-24 DIAGNOSIS — M25.561 PAIN IN RIGHT KNEE: ICD-10-CM

## 2022-06-24 PROCEDURE — 20610 DRAIN/INJ JOINT/BURSA W/O US: CPT | Mod: RT

## 2022-06-24 PROCEDURE — 99213 OFFICE O/P EST LOW 20 MIN: CPT | Mod: 25

## 2022-06-24 NOTE — HISTORY OF PRESENT ILLNESS
[Pain Location] : pain [] : right knee [Worsening] : worsening [5] : a current pain level of 5/10 [8] : an average pain level of 8/10 [7] : a minimum pain level of 7/10 [9] : a maximum pain level of 9/10 [Constant] : ~He/She~ states the symptoms seem to be constant [Direct Pressure] : worsened by direct pressure [Walking] : worsened by walking [Knee Flexion] : worsened with knee flexion [Knee Extension] : worsened with knee extension [Rest] : relieved by rest [de-identified] : 06/24/2022 : CINDY ZAMBRANO  is a 52 year year old female who presents to the office for follow-up evaluation of her bilateral knee pain today.  She states that she want to hold off on injection last time because she had to go back to work and was a little concerned.  She states she is here for an injection today for her right knee.  She states her symptoms are essentially unchanged since last office visit.  She denies any numbness or tingling distally.  She has no other injuries.\par \par 6/7/2022: Cindy is a pleasant 52-year-old female who presents to the office today complaining of bilateral knee pain which is much worse on her right side.  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  She has not dislocated kneecap in many years but has had issues on and off with the knee over the past several years.  Her pain is activity related and alleviated by rest.  Hurts to bend down or squat.  Hurts to go up and down stairs.  Over the past couple of weeks her pain has become very severe and she had x-rays at her Health system facility.  She comes in to discuss her treatment options with me today.  Of note, the patient has never dislocated her left patella.  The patient denies any fevers, chills, sweats, recent illnesses, numbness, tingling, weakness, or pain elsewhere at this time.\par 	\par She denies prior injury.\par She had a previous injury to \par She denies paresthesia.\par She has paresthesia in \par She denies night symptoms.\par She endorses night symptoms that wake her up.\par Symptoms worsen with activity.\par She is not diabetic.\par She is diabetic, and her most recent hemoglobin A1c level reportedly was % in of 2021\par She is diabetic, and cannot recall her most recent hemoglobin A1c level\par She has a history of gastric problems.\par

## 2022-06-24 NOTE — CONSULT LETTER
[Dear  ___] : Dear  [unfilled], [Consult Letter:] : I had the pleasure of evaluating your patient, [unfilled]. [( Thank you for referring [unfilled] for consultation for _____ )] : Thank you for referring [unfilled] for consultation for [unfilled] [Please see my note below.] : Please see my note below. [Consult Closing:] : Thank you very much for allowing me to participate in the care of this patient.  If you have any questions, please do not hesitate to contact me. [Sincerely,] : Sincerely, [FreeTextEntry2] : SIVAKUMAR CONDE\par  [FreeTextEntry3] : James Corey DO.\par Sports Medicine \par Orthopaedic Surgery\par \par [facility] [place]\par [facility] \par [address] \[address] \[place] [address]\par \par Tel [phone]\par Fax [phone]\par \par For same day and next day orthopedic appointments contact:\par [email or web address] |[phone])\par Appointments available nights and weekends!  \par \par [facility] [place]\par Visit us at [facility]/orthopaedic\par

## 2022-06-24 NOTE — PROCEDURE
[de-identified] : I injected the patient's right knee today with cortisone.\par  \par I discussed at length with the patient the planned steroid and lidocaine injection. The risks, benefits, convalescence and alternatives were reviewed. The possible side effects discussed included but were not limited to: pain, swelling, heat, bleeding, and redness. Symptoms are generally mild but if they are extensive then contact the office. Giving pain relievers by mouth such as NSAIDs or Tylenol can generally treat the reactions to steroid and lidocaine. Rare cases of infection have been noted. Rash, hives and itching may occur post injection. If you have muscle pain or cramps, flushing and or swelling of the face, rapid heart beat, nausea, dizziness, fever, chills, headache, difficulty breathing, swelling in the arms or legs, or have a prickly feeling of your skin, contact a health care provider immediately. Following this discussion, the knee was prepped with Chlorhexidine and Alcohol and under sterile conditions the 80 mg Depo-Medrol and 4 cc Lidocaine injection was performed with a 22 gauge needle through a anterolateral injection site. The needle was introduced into the joint, aspiration was performed to ensure intra-articular placement and the medication was injected. Upon withdrawal of the needle the site was cleaned with alcohol and a band aid applied. The patient tolerated the injection well and there were no adverse effects. Post injection instructions included no strenuous activity for 24 hours, cryotherapy and if there are any adverse effects to contact the office.

## 2022-06-24 NOTE — DISCUSSION/SUMMARY
[de-identified] : Assessment: 52-year-old female with  severe right patellofemoral arthritis, mild tricompartmental left knee arthritis\par \par Plan: I had a long discussion with the patient today regarding the nature of their diagnosis and treatment plan. We discussed the risks and benefits of no treatment as well as nonoperative and operative treatments.  I reviewed the patient's x-rays today with her in the office which demonstrate severe arthritis of the patellofemoral compartment of the right knee and mild arthritis of the left knee.  At this time I recommend conservative treatment of the patient's condition with modalities including rest, ice, heat, anti-inflammatory medications, activity modifications, and home stretching and strengthening exercises daily.  She will take Mobic 15 mg once daily for pain and nation.  GI precautions were discussed.  I am also recommending physical therapy for her bilateral knees.  She was provided a new prescription today.  I am also recommending a cortisone injection of the right knee be administered in office today.  She tolerated procedure well with no adverse effects.  She will follow-up in 8 weeks for repeat evaluation.  Patient discussed and reviewed with Dr. Corey today.\par \par The patient verbalizes their understanding and agrees with the plan.  All questions were answered to their satisfaction.

## 2022-07-20 RX ORDER — MELOXICAM 15 MG/1
15 TABLET ORAL
Qty: 21 | Refills: 0 | Status: ACTIVE | COMMUNITY
Start: 2022-07-20 | End: 1900-01-01

## 2022-08-10 ENCOUNTER — APPOINTMENT (OUTPATIENT)
Dept: ORTHOPEDIC SURGERY | Facility: CLINIC | Age: 52
End: 2022-08-10

## 2022-09-23 ENCOUNTER — APPOINTMENT (OUTPATIENT)
Dept: ORTHOPEDIC SURGERY | Facility: CLINIC | Age: 52
End: 2022-09-23

## 2022-09-23 DIAGNOSIS — M25.562 PAIN IN RIGHT KNEE: ICD-10-CM

## 2022-09-23 DIAGNOSIS — M25.561 PAIN IN RIGHT KNEE: ICD-10-CM

## 2022-09-23 PROCEDURE — 99213 OFFICE O/P EST LOW 20 MIN: CPT

## 2022-09-23 NOTE — PHYSICAL EXAM
[de-identified] : General:\par Awake, alert, no acute distress, Patient was cooperative and appropriate during the examination.\par \par The patient is overweight for height and age.\par \par Walks with a mildly antalgic gait on her right side.\par \par Full, painless range of motion of the neck and back.\par \par Exam of the bilateral lower extremities is intact and symmetric with regards to dermatologic, vascular, and neurologic exam. Bilateral lower extremity sensation is grossly intact to light touch in the DP/SP/T/S/S nerve distributions. Intact DF/PF/EHL. BIlateral lower extremity warm and well-perfused with brisk capillary refill.\par \par Pulmonary:\par Regular, nonlabored breathing\par \par Abdomen:\par Soft, nontender, nondistended.\par \par Lymphatic:\par No evidence of inguinal lymphadenopathy\par \par Bilateral Knee Examination:\par Physical examination of the knees demonstrates normal skin without signs of skin changes or abnormalities. No erythema, warmth, or joint effusion is appreciated.\par \par Sensation is intact to light touch L2-S1\par Palpable DP/PT pulse\par EHL/FHL/TA/GSC motor function intact\par \par Range of Motion\par 0 to 130 degrees bilaterally with pain at terminal flexion on the right side\par \par Strength Testing\par Quadriceps/Hamstrings 5/5 bilaterally\par Patient is able to perform a straight leg raise bilaterally without difficulty.\par \par Palpation\par Not tender to palpation about the distal femurs, proximal tibias\par Mildly tender to palpation about the right patellofemoral compartment\par No palpable defect appreciated in the quadriceps or patellar tendons\par Not tender to palpation of medial joint lines\par Not tender to palpation of lateral joint lines\par \par Special Tests\par Anterior Drawer negative bilaterally\par Posterior Drawer negative bilaterally\par Lachman Exam negative bilaterally\par No Varus or Valgus Laxity at 0 or 30 degrees of knee flexion bilaterally\par Kevin's Test negative bilaterally\par Active compression of the patella positive for pain and crepitus bilaterally\par Translation of the patella 2+ quadrants limited by patient apprehension and guarding bilaterally\par \par \par \par \par \par \par  [de-identified] : X-rays including multiple views of the right knee from a [facility] imaging facility were reviewed today in the office.  The patient has no acute fracture or dislocation.  She has advanced arthritic changes, particularly in the patellofemoral compartment related to chronic patellar instability.  She has lateral translation and subluxation of the patella with complete obliteration of the articular cartilage surfaces.  There is abundant osteophyte formation.  She also has patella adilene.  There is mild arthritis in the medial compartment with osteophyte formation as well.\par \par X-rays including 3 views of the left knee were obtained in the office today on 6/7/2022 and reviewed the patient.  There is no acute fracture or dislocation.  The patient has mild there are tricompartmental osteoarthritic changes.  She has patella adilene and mild patellar lateral translation.

## 2022-09-23 NOTE — DISCUSSION/SUMMARY
[de-identified] : Assessment: 52-year-old female with  severe right patellofemoral arthritis, mild tricompartmental left knee arthritis\par \par Plan: I had a long discussion with the patient today regarding the nature of their diagnosis and treatment plan. We discussed the risks and benefits of no treatment as well as nonoperative and operative treatments.  I reviewed the patient's x-rays today with her in the office which demonstrate severe arthritis of the patellofemoral compartment of the right knee and mild arthritis of the left knee.  At this time I am recommending continued conservative treatment including ice, heat, rest, physical therapy both formally and on her own for symptomatic relief.  She will use braces as needed and will avoid exacerbating activities and perform activities as tolerated.  She will follow-up on an as-needed basis and we talked about potential cortisone injections versus gel injections versus knee replacement in the future pending her symptoms.  She understands and agrees and all questions were answered.  Patient discussed and reviewed with Dr. Corey today.\par \par The patient verbalizes their understanding and agrees with the plan.  All questions were answered to their satisfaction.

## 2022-09-23 NOTE — HISTORY OF PRESENT ILLNESS
[Pain Location] : pain [] : right knee [Worsening] : worsening [5] : a current pain level of 5/10 [8] : an average pain level of 8/10 [7] : a minimum pain level of 7/10 [9] : a maximum pain level of 9/10 [Constant] : ~He/She~ states the symptoms seem to be constant [Direct Pressure] : worsened by direct pressure [Walking] : worsened by walking [Knee Flexion] : worsened with knee flexion [Knee Extension] : worsened with knee extension [Rest] : relieved by rest [de-identified] : 09/23/2022 : CINDY ZAMBRANO  is a 52 year year old female who presents to the office for follow-up evaluation of her bilateral knees today right worse than left.  She states that the cortisone injection given to her at the last office visit did give her some decent relief for a couple weeks however she tripped and started up again shortly after the injection.  She states since then she is done physical therapy which has been very helpful and her symptoms are overall improved.  She states she does still have pain with bending and going up and down stairs that is alleviated with rest but she can live with the pain right now.  She states she knows a knee replacement is in her future however her pain is tolerable currently.  She is here for routine follow-up today.  She denies any numbness or tingling distally.  She denies any new acute injuries.\par \par 06/24/2022 : CINDY ZAMBRANO  is a 52 year year old female who presents to the office for follow-up evaluation of her bilateral knee pain today.  She states that she want to hold off on injection last time because she had to go back to work and was a little concerned.  She states she is here for an injection today for her right knee.  She states her symptoms are essentially unchanged since last office visit.  She denies any numbness or tingling distally.  She has no other injuries.\par \par 6/7/2022: Cindy is a pleasant 52-year-old female who presents to the office today complaining of bilateral knee pain which is much worse on her right side.  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  She has not dislocated kneecap in many years but has had issues on and off with the knee over the past several years.  Her pain is activity related and alleviated by rest.  Hurts to bend down or squat.  Hurts to go up and down stairs.  Over the past couple of weeks her pain has become very severe and she had x-rays at her [facility] facility.  She comes in to discuss her treatment options with me today.  Of note, the patient has never dislocated her left patella.  The patient denies any fevers, chills, sweats, recent illnesses, numbness, tingling, weakness, or pain elsewhere at this time.\par 	\par She denies prior injury.\par She had a previous injury to \par She denies paresthesia.\par She has paresthesia in \par She denies night symptoms.\par She endorses night symptoms that wake her up.\par Symptoms worsen with activity.\par She is not diabetic.\par She is diabetic, and her most recent hemoglobin A1c level reportedly was % in of 2021\par She is diabetic, and cannot recall her most recent hemoglobin A1c level\par She has a history of gastric problems.\par

## 2022-09-23 NOTE — CONSULT LETTER
[Dear  ___] : Dear  [unfilled], [Consult Letter:] : I had the pleasure of evaluating your patient, [unfilled]. [( Thank you for referring [unfilled] for consultation for _____ )] : Thank you for referring [unfilled] for consultation for [unfilled] [Please see my note below.] : Please see my note below. [Consult Closing:] : Thank you very much for allowing me to participate in the care of this patient.  If you have any questions, please do not hesitate to contact me. [Sincerely,] : Sincerely, [FreeTextEntry2] : SIVAKUMAR CONDE\par  [FreeTextEntry3] : James Corey DO.\par Sports Medicine \par Orthopaedic Surgery\par \par [facility] [place]\par [facility] \par [address] \[address] \[place] [address]\par \par Tel [phone]\par Fax [phone]\par \par For same day and next day orthopedic appointments contact:\par [email or web address] |[phone])\par Appointments available nights and weekends!  \par \par [facility] [place]\par Visit us at [facility]/orthopaedic\par

## 2022-11-19 ENCOUNTER — APPOINTMENT (OUTPATIENT)
Dept: ULTRASOUND IMAGING | Facility: CLINIC | Age: 52
End: 2022-11-19

## 2022-11-19 ENCOUNTER — OUTPATIENT (OUTPATIENT)
Dept: OUTPATIENT SERVICES | Facility: HOSPITAL | Age: 52
LOS: 1 days | End: 2022-11-19
Payer: COMMERCIAL

## 2022-11-19 ENCOUNTER — APPOINTMENT (OUTPATIENT)
Dept: MAMMOGRAPHY | Facility: CLINIC | Age: 52
End: 2022-11-19

## 2022-11-19 DIAGNOSIS — D48.9 NEOPLASM OF UNCERTAIN BEHAVIOR, UNSPECIFIED: Chronic | ICD-10-CM

## 2022-11-19 DIAGNOSIS — Z98.89 OTHER SPECIFIED POSTPROCEDURAL STATES: Chronic | ICD-10-CM

## 2022-11-19 DIAGNOSIS — R92.8 OTHER ABNORMAL AND INCONCLUSIVE FINDINGS ON DIAGNOSTIC IMAGING OF BREAST: ICD-10-CM

## 2022-11-19 DIAGNOSIS — Z00.8 ENCOUNTER FOR OTHER GENERAL EXAMINATION: ICD-10-CM

## 2022-11-19 DIAGNOSIS — D17.1 BENIGN LIPOMATOUS NEOPLASM OF SKIN AND SUBCUTANEOUS TISSUE OF TRUNK: Chronic | ICD-10-CM

## 2022-11-19 PROCEDURE — 77063 BREAST TOMOSYNTHESIS BI: CPT

## 2022-11-19 PROCEDURE — 77067 SCR MAMMO BI INCL CAD: CPT | Mod: 26

## 2022-11-19 PROCEDURE — 77063 BREAST TOMOSYNTHESIS BI: CPT | Mod: 26

## 2022-11-19 PROCEDURE — 76641 ULTRASOUND BREAST COMPLETE: CPT | Mod: 26,50

## 2022-11-19 PROCEDURE — 77067 SCR MAMMO BI INCL CAD: CPT

## 2022-11-19 PROCEDURE — 76641 ULTRASOUND BREAST COMPLETE: CPT

## 2023-01-11 ENCOUNTER — EMERGENCY (EMERGENCY)
Facility: HOSPITAL | Age: 53
LOS: 1 days | Discharge: HOME | End: 2023-01-11
Attending: STUDENT IN AN ORGANIZED HEALTH CARE EDUCATION/TRAINING PROGRAM
Payer: COMMERCIAL

## 2023-01-11 VITALS
TEMPERATURE: 98 F | SYSTOLIC BLOOD PRESSURE: 159 MMHG | OXYGEN SATURATION: 98 % | RESPIRATION RATE: 16 BRPM | HEART RATE: 111 BPM | DIASTOLIC BLOOD PRESSURE: 89 MMHG | WEIGHT: 220.02 LBS | HEIGHT: 62 IN

## 2023-01-11 DIAGNOSIS — D48.9 NEOPLASM OF UNCERTAIN BEHAVIOR, UNSPECIFIED: Chronic | ICD-10-CM

## 2023-01-11 DIAGNOSIS — D17.1 BENIGN LIPOMATOUS NEOPLASM OF SKIN AND SUBCUTANEOUS TISSUE OF TRUNK: Chronic | ICD-10-CM

## 2023-01-11 DIAGNOSIS — Z98.89 OTHER SPECIFIED POSTPROCEDURAL STATES: Chronic | ICD-10-CM

## 2023-01-11 LAB
ALBUMIN SERPL ELPH-MCNC: 4.5 G/DL — SIGNIFICANT CHANGE UP (ref 3.3–5.2)
ALP SERPL-CCNC: 85 U/L — SIGNIFICANT CHANGE UP (ref 40–120)
ALT FLD-CCNC: 18 U/L — SIGNIFICANT CHANGE UP
ANION GAP SERPL CALC-SCNC: 12 MMOL/L — SIGNIFICANT CHANGE UP (ref 5–17)
APTT BLD: 29.6 SEC — SIGNIFICANT CHANGE UP (ref 27.5–35.5)
AST SERPL-CCNC: 23 U/L — SIGNIFICANT CHANGE UP
BASOPHILS # BLD AUTO: 0.05 K/UL — SIGNIFICANT CHANGE UP (ref 0–0.2)
BASOPHILS NFR BLD AUTO: 0.5 % — SIGNIFICANT CHANGE UP (ref 0–2)
BILIRUB SERPL-MCNC: 0.3 MG/DL — LOW (ref 0.4–2)
BUN SERPL-MCNC: 15.4 MG/DL — SIGNIFICANT CHANGE UP (ref 8–20)
CALCIUM SERPL-MCNC: 9.8 MG/DL — SIGNIFICANT CHANGE UP (ref 8.4–10.5)
CHLORIDE SERPL-SCNC: 102 MMOL/L — SIGNIFICANT CHANGE UP (ref 96–108)
CO2 SERPL-SCNC: 25 MMOL/L — SIGNIFICANT CHANGE UP (ref 22–29)
CREAT SERPL-MCNC: 0.73 MG/DL — SIGNIFICANT CHANGE UP (ref 0.5–1.3)
D DIMER BLD IA.RAPID-MCNC: <150 NG/ML DDU — SIGNIFICANT CHANGE UP
EGFR: 98 ML/MIN/1.73M2 — SIGNIFICANT CHANGE UP
EOSINOPHIL # BLD AUTO: 0.15 K/UL — SIGNIFICANT CHANGE UP (ref 0–0.5)
EOSINOPHIL NFR BLD AUTO: 1.5 % — SIGNIFICANT CHANGE UP (ref 0–6)
GLUCOSE SERPL-MCNC: 79 MG/DL — SIGNIFICANT CHANGE UP (ref 70–99)
HCG SERPL-ACNC: <4 MIU/ML — SIGNIFICANT CHANGE UP
HCT VFR BLD CALC: 38.9 % — SIGNIFICANT CHANGE UP (ref 34.5–45)
HGB BLD-MCNC: 12.6 G/DL — SIGNIFICANT CHANGE UP (ref 11.5–15.5)
IMM GRANULOCYTES NFR BLD AUTO: 0.4 % — SIGNIFICANT CHANGE UP (ref 0–0.9)
INR BLD: 1.08 RATIO — SIGNIFICANT CHANGE UP (ref 0.88–1.16)
LIDOCAIN IGE QN: 47 U/L — SIGNIFICANT CHANGE UP (ref 22–51)
LYMPHOCYTES # BLD AUTO: 3.14 K/UL — SIGNIFICANT CHANGE UP (ref 1–3.3)
LYMPHOCYTES # BLD AUTO: 32.1 % — SIGNIFICANT CHANGE UP (ref 13–44)
MAGNESIUM SERPL-MCNC: 2.3 MG/DL — SIGNIFICANT CHANGE UP (ref 1.6–2.6)
MCHC RBC-ENTMCNC: 26.9 PG — LOW (ref 27–34)
MCHC RBC-ENTMCNC: 32.4 GM/DL — SIGNIFICANT CHANGE UP (ref 32–36)
MCV RBC AUTO: 82.9 FL — SIGNIFICANT CHANGE UP (ref 80–100)
MONOCYTES # BLD AUTO: 0.63 K/UL — SIGNIFICANT CHANGE UP (ref 0–0.9)
MONOCYTES NFR BLD AUTO: 6.4 % — SIGNIFICANT CHANGE UP (ref 2–14)
NEUTROPHILS # BLD AUTO: 5.78 K/UL — SIGNIFICANT CHANGE UP (ref 1.8–7.4)
NEUTROPHILS NFR BLD AUTO: 59.1 % — SIGNIFICANT CHANGE UP (ref 43–77)
NT-PROBNP SERPL-SCNC: 31 PG/ML — SIGNIFICANT CHANGE UP (ref 0–300)
PLATELET # BLD AUTO: 317 K/UL — SIGNIFICANT CHANGE UP (ref 150–400)
POTASSIUM SERPL-MCNC: 4.4 MMOL/L — SIGNIFICANT CHANGE UP (ref 3.5–5.3)
POTASSIUM SERPL-SCNC: 4.4 MMOL/L — SIGNIFICANT CHANGE UP (ref 3.5–5.3)
PROT SERPL-MCNC: 7.5 G/DL — SIGNIFICANT CHANGE UP (ref 6.6–8.7)
PROTHROM AB SERPL-ACNC: 12.5 SEC — SIGNIFICANT CHANGE UP (ref 10.5–13.4)
RBC # BLD: 4.69 M/UL — SIGNIFICANT CHANGE UP (ref 3.8–5.2)
RBC # FLD: 13.5 % — SIGNIFICANT CHANGE UP (ref 10.3–14.5)
SODIUM SERPL-SCNC: 139 MMOL/L — SIGNIFICANT CHANGE UP (ref 135–145)
TROPONIN T SERPL-MCNC: <0.01 NG/ML — SIGNIFICANT CHANGE UP (ref 0–0.06)
WBC # BLD: 9.79 K/UL — SIGNIFICANT CHANGE UP (ref 3.8–10.5)
WBC # FLD AUTO: 9.79 K/UL — SIGNIFICANT CHANGE UP (ref 3.8–10.5)

## 2023-01-11 PROCEDURE — 99284 EMERGENCY DEPT VISIT MOD MDM: CPT

## 2023-01-11 PROCEDURE — 71046 X-RAY EXAM CHEST 2 VIEWS: CPT

## 2023-01-11 PROCEDURE — 85610 PROTHROMBIN TIME: CPT

## 2023-01-11 PROCEDURE — 80053 COMPREHEN METABOLIC PANEL: CPT

## 2023-01-11 PROCEDURE — 83690 ASSAY OF LIPASE: CPT

## 2023-01-11 PROCEDURE — 84702 CHORIONIC GONADOTROPIN TEST: CPT

## 2023-01-11 PROCEDURE — 93005 ELECTROCARDIOGRAM TRACING: CPT

## 2023-01-11 PROCEDURE — 96374 THER/PROPH/DIAG INJ IV PUSH: CPT

## 2023-01-11 PROCEDURE — 36415 COLL VENOUS BLD VENIPUNCTURE: CPT

## 2023-01-11 PROCEDURE — 85730 THROMBOPLASTIN TIME PARTIAL: CPT

## 2023-01-11 PROCEDURE — 85379 FIBRIN DEGRADATION QUANT: CPT

## 2023-01-11 PROCEDURE — 85025 COMPLETE CBC W/AUTO DIFF WBC: CPT

## 2023-01-11 PROCEDURE — 83735 ASSAY OF MAGNESIUM: CPT

## 2023-01-11 PROCEDURE — 99285 EMERGENCY DEPT VISIT HI MDM: CPT | Mod: 25

## 2023-01-11 PROCEDURE — 83880 ASSAY OF NATRIURETIC PEPTIDE: CPT

## 2023-01-11 PROCEDURE — 84484 ASSAY OF TROPONIN QUANT: CPT

## 2023-01-11 PROCEDURE — 93010 ELECTROCARDIOGRAM REPORT: CPT

## 2023-01-11 PROCEDURE — 71046 X-RAY EXAM CHEST 2 VIEWS: CPT | Mod: 26

## 2023-01-11 RX ORDER — METHOCARBAMOL 500 MG/1
2 TABLET, FILM COATED ORAL
Qty: 18 | Refills: 0
Start: 2023-01-11 | End: 2023-01-13

## 2023-01-11 RX ORDER — KETOROLAC TROMETHAMINE 30 MG/ML
15 SYRINGE (ML) INJECTION ONCE
Refills: 0 | Status: DISCONTINUED | OUTPATIENT
Start: 2023-01-11 | End: 2023-01-11

## 2023-01-11 RX ADMIN — Medication 15 MILLIGRAM(S): at 20:13

## 2023-01-11 NOTE — ED STATDOCS - PATIENT PORTAL LINK FT
You can access the FollowMyHealth Patient Portal offered by [facility] by registering at the following website: [email or web address]. By joining [employer]’s FollowMyHealth portal, you will also be able to view your health information using other applications (apps) compatible with our system.

## 2023-01-11 NOTE — ED STATDOCS - CLINICAL SUMMARY MEDICAL DECISION MAKING FREE TEXT BOX
51 y/o female with PMHx of DM, HLD, and Pericarditis s/p  x1 presents to ED c/o neck pain. Will evaluate for ACS as well as PE vs MSK pain. Will do EKG, CXR, troponin, and D-dimer since patient is low risk PE.

## 2023-01-11 NOTE — ED ADULT TRIAGE NOTE - CHIEF COMPLAINT QUOTE
Pt with pain in the right side of her neck and the back of her neck with a headache for the last few days. Pt also report that she is starting to feel SOB.

## 2023-01-11 NOTE — ED STATDOCS - NSFOLLOWUPINSTRUCTIONS_ED_ALL_ED_FT
Neck Pain    Neck pain is very common in adults. The cause of back pain is rarely dangerous and the pain often gets better over time. The cause of your neck pain may not be known and may include strain of muscles or ligaments, degeneration of the spinal disks, or arthritis. Occasionally the pain may radiate down your arms(s). Over-the-counter medicines to reduce pain and inflammation are often the most helpful. Stretching and remaining active frequently helps the healing process.     SEEK IMMEDIATE MEDICAL CARE IF YOU HAVE ANY OF THE FOLLOWING SYMPTOMS: bowel or bladder control problems, unusual weakness or numbness in your arms or legs, nausea or vomiting, abdominal pain, fever, dizziness/lightheadedness.    Chest Pain    Chest pain can be caused by many different conditions which may or may not be dangerous. Causes include heartburn, lung infections, heart attack, blood clot in lungs, skin infections, strain or damage to muscle, cartilage, or bones, etc. In addition to a history and physical examination, an electrocardiogram (ECG) or other lab tests may have been performed to determine the cause of your chest pain. Follow up with your primary care provider or with a cardiologist as instructed.     SEEK IMMEDIATE MEDICAL CARE IF YOU HAVE ANY OF THE FOLLOWING SYMPTOMS: worsening chest pain, coughing up blood, unexplained back/neck/jaw pain, severe abdominal pain, dizziness or lightheadedness, fainting, shortness of breath, sweaty or clammy skin, vomiting, or racing heart beat. These symptoms may represent a serious problem that is an emergency. Do not wait to see if the symptoms will go away. Get medical help right away. Call 911 and do not drive yourself to the hospital.

## 2023-01-11 NOTE — ED STATDOCS - CARE PROVIDERS DIRECT ADDRESSES
[email or web address]

## 2023-01-11 NOTE — ED STATDOCS - OBJECTIVE STATEMENT
51 y/o female with PMHx of DM, HLD, and Pericarditis s/p  x1 presents to ED c/o neck pain. Patient reports neck pain that shoots up into the back of her head x a few days. States today she developed shortness of breath and mild chest pain. Reports the neck pain is constant, tried using a heating pad with no relief. Reports the chest pain was sudden onset, no alleviating factors. Patient took Advil with no relief. Patient is vaccinated for COVID and flu.     Pt denies fevers/chills, ha, loc, focal neuro deficits, palp, cough, abd pain/n/v/d, urinary symptoms, recent travel. Smoking, drinking, drug use, recent surgery

## 2023-01-11 NOTE — ED STATDOCS - CARE PROVIDER_API CALL
Hunter Duval)  Orthopaedic Surgery  [address] [place] [address]  Phone: [phone]  Fax: [phone]  Follow Up Time:     Mario Ng (DO)  Cardiovascular Disease; Internal Medicine  [address] [place] [address]  Phone: [phone]  Fax: [phone]  Follow Up Time:

## 2023-01-12 NOTE — ED ADULT NURSE NOTE - OBJECTIVE STATEMENT
pt to ED with complaints of neck pain. pt states she has been experiencing the pain x 3 days and the pain has been radiating up into her head. pt also reports she developed sudden onset of CP/SOB while walking today, pt states it subsided quickly.

## 2023-03-04 ENCOUNTER — LABORATORY RESULT (OUTPATIENT)
Age: 53
End: 2023-03-04

## 2023-03-13 ENCOUNTER — APPOINTMENT (OUTPATIENT)
Dept: ENDOCRINOLOGY | Facility: CLINIC | Age: 53
End: 2023-03-13
Payer: COMMERCIAL

## 2023-03-13 VITALS — SYSTOLIC BLOOD PRESSURE: 128 MMHG | OXYGEN SATURATION: 98 % | HEART RATE: 94 BPM | DIASTOLIC BLOOD PRESSURE: 82 MMHG

## 2023-03-13 VITALS — HEIGHT: 61 IN | BODY MASS INDEX: 41.54 KG/M2 | WEIGHT: 220 LBS

## 2023-03-13 LAB — GLUCOSE BLDC GLUCOMTR-MCNC: 95

## 2023-03-13 PROCEDURE — 82962 GLUCOSE BLOOD TEST: CPT

## 2023-03-13 PROCEDURE — 99214 OFFICE O/P EST MOD 30 MIN: CPT | Mod: 25

## 2023-03-13 NOTE — ASSESSMENT
[Diabetes Foot Care] : diabetes foot care [Long Term Vascular Complications] : long term vascular complications of diabetes [Importance of Diet and Exercise] : importance of diet and exercise to improve glycemic control, achieve weight loss and improve cardiovascular health [Exercise/Effect on Glucose] : exercise/effect on glucose [Retinopathy Screening] : Patient was referred to ophthalmology for retinopathy screening [Levothyroxine] : The patient was instructed to take Levothyroxine on an empty stomach, separate from vitamins, and wait at least 30 minutes before eating [FreeTextEntry1] : 51 y/o female with Type 2 DM, Elevated LDL, Vitamin D Deficiency, and Hypothyroidism.\par \par Plan: \par Type 2 DM: glycemic control is unknown due to no glucose monitoring and no recent A1C, check labs today in office\par - Increase current medication regimen to Trulicity 3 mg weekly \par - educated on healthy food choices \par - encouraged to increase routine exercise\par - educated on the importance of routine retinopathy screening \par \par Hypothyroidism: on replacement, levels stable \par - continue current dose of Levothyroxine 125 mcg daily\par \par HLD - Choelsterol has worsened Increased with diet and exercise efforts \par \par Vitamin D Deficiency: start Vit D 96939 units once a week \par \par psych- support given for sister's death - will consider counselling \par \par Osteoporosis Screening\par  - LMP about 5 years ago \par Last DEXA scan 4 years ago from [facility], was normal \par \par Follow up in 3 months with NP\par Follow up with MD in 6 months\par \par \par

## 2023-03-13 NOTE — REVIEW OF SYSTEMS
[Fatigue] : fatigue [Polyuria] : polyuria [Polydipsia] : polydipsia [Recent Weight Gain (___ Lbs)] : no recent weight gain [Recent Weight Loss (___ Lbs)] : no recent weight loss [Visual Field Defect] : no visual field defect [Blurred Vision] : no blurred vision [Dysphagia] : no dysphagia [Neck Pain] : no neck pain [Chest Pain] : no chest pain [Palpitations] : no palpitations [Shortness Of Breath] : no shortness of breath [Nausea] : no nausea [Constipation] : no constipation [Vomiting] : no vomiting [Diarrhea] : no diarrhea

## 2023-03-13 NOTE — HISTORY OF PRESENT ILLNESS
[FreeTextEntry1] : Last seen 2022\par Hypothyroidism and T2DM \par Lot of stress sister  suddenly in 2021 and now her aunt  3/2022 \par \par \par Quality: Type 2 DM\par Severity: controlled\par Duration: a couple years ago\par Onset: GDM 19 years ago, routine labs\par Modifying Factors: Better with medication\par Associated Symptoms: neuropathy \par \par Current Regimen:\par Trulicity 3 mg weekly - no issues \par no weight loss from medication \par \par Self blood sugar monitoring: not checking at home \par Current BG 95--> cup of coffee and babybel cheese \par \par Weight: stable \par \par exercise: none, knee has been bothering her \par \par Diet: not has been best, can not eat too much protein, still earing carbs \par B- English muffin with peanut butter or ham and cheese, coffee with some sugar and milk \par L- brown rice with chicken\par D- steak, varies\par Snacks- fruit, gluten free pretzels \par \par \par Hypothyroidism\par -Levothyroxine 125 mcg daily- taking appropriately \par \par Date of last eye exam: 2022  (-) diabetic retinopathy \par Date of last foot exam: sporadically, denies numbness and tingling in feet \par Date of last flu vaccine: \par Date of last Pneumovax: no\par COVID vaccine \par

## 2023-05-24 ENCOUNTER — RX RENEWAL (OUTPATIENT)
Age: 53
End: 2023-05-24

## 2023-06-12 ENCOUNTER — APPOINTMENT (OUTPATIENT)
Dept: ENDOCRINOLOGY | Facility: CLINIC | Age: 53
End: 2023-06-12
Payer: COMMERCIAL

## 2023-06-12 VITALS — OXYGEN SATURATION: 99 % | HEART RATE: 94 BPM | DIASTOLIC BLOOD PRESSURE: 90 MMHG | SYSTOLIC BLOOD PRESSURE: 122 MMHG

## 2023-06-12 VITALS — BODY MASS INDEX: 41.72 KG/M2 | HEIGHT: 61 IN | WEIGHT: 221 LBS

## 2023-06-12 DIAGNOSIS — Z13.820 ENCOUNTER FOR SCREENING FOR OSTEOPOROSIS: ICD-10-CM

## 2023-06-12 DIAGNOSIS — E53.8 DEFICIENCY OF OTHER SPECIFIED B GROUP VITAMINS: ICD-10-CM

## 2023-06-12 DIAGNOSIS — E66.3 OVERWEIGHT: ICD-10-CM

## 2023-06-12 LAB
25(OH)D3 SERPL-MCNC: 26.9 NG/ML
ALBUMIN SERPL ELPH-MCNC: 4.2 G/DL
ALP BLD-CCNC: 89 U/L
ALT SERPL-CCNC: 20 U/L
ANION GAP SERPL CALC-SCNC: 12 MMOL/L
AST SERPL-CCNC: 16 U/L
BILIRUB SERPL-MCNC: 0.4 MG/DL
BUN SERPL-MCNC: 15 MG/DL
CALCIUM SERPL-MCNC: 9.4 MG/DL
CHLORIDE SERPL-SCNC: 105 MMOL/L
CHOLEST SERPL-MCNC: 171 MG/DL
CO2 SERPL-SCNC: 23 MMOL/L
CREAT SERPL-MCNC: 0.83 MG/DL
CREAT SPEC-SCNC: 136 MG/DL
EGFR: 84 ML/MIN/1.73M2
ESTIMATED AVERAGE GLUCOSE: 128 MG/DL
GLUCOSE BLDC GLUCOMTR-MCNC: 96
GLUCOSE SERPL-MCNC: 98 MG/DL
HBA1C MFR BLD HPLC: 6.1 %
HDLC SERPL-MCNC: 60 MG/DL
LDLC SERPL CALC-MCNC: 100 MG/DL
MICROALBUMIN 24H UR DL<=1MG/L-MCNC: <1.2 MG/DL
MICROALBUMIN/CREAT 24H UR-RTO: NORMAL MG/G
NONHDLC SERPL-MCNC: 112 MG/DL
POTASSIUM SERPL-SCNC: 4.5 MMOL/L
PROT SERPL-MCNC: 6.8 G/DL
SODIUM SERPL-SCNC: 140 MMOL/L
T3FREE SERPL-MCNC: 3.04 PG/ML
T4 FREE SERPL-MCNC: 1.6 NG/DL
TRIGL SERPL-MCNC: 57 MG/DL
TSH SERPL-ACNC: 2.28 UIU/ML
VIT B12 SERPL-MCNC: 582 PG/ML

## 2023-06-12 PROCEDURE — 82962 GLUCOSE BLOOD TEST: CPT

## 2023-06-12 PROCEDURE — 99214 OFFICE O/P EST MOD 30 MIN: CPT | Mod: 25

## 2023-06-12 RX ORDER — BLOOD SUGAR DIAGNOSTIC
STRIP MISCELLANEOUS 3 TIMES DAILY
Qty: 300 | Refills: 0 | Status: DISCONTINUED | COMMUNITY
Start: 2019-08-07 | End: 2023-06-12

## 2023-06-12 RX ORDER — DULAGLUTIDE 3 MG/.5ML
3 INJECTION, SOLUTION SUBCUTANEOUS
Qty: 3 | Refills: 1 | Status: ACTIVE | COMMUNITY
Start: 2018-05-21 | End: 1900-01-01

## 2023-06-12 NOTE — ASSESSMENT
[Carbohydrate Consistent Diet] : carbohydrate consistent diet [Importance of Diet and Exercise] : importance of diet and exercise to improve glycemic control, achieve weight loss and improve cardiovascular health [Exercise/Effect on Glucose] : exercise/effect on glucose [Retinopathy Screening] : Patient was referred to ophthalmology for retinopathy screening [Levothyroxine] : The patient was instructed to take Levothyroxine on an empty stomach, separate from vitamins, and wait at least 30 minutes before eating [FreeTextEntry1] : 54 y/o female with Type 2 DM, Elevated LDL, Vitamin D Deficiency, and Hypothyroidism.\par \par Plan: \par Type 2 DM: glycemic control is in control\par - Continue Trulicity 3 mg weekly \par - educated on healthy food choices \par - encouraged to increase routine exercise\par - educated on the importance of routine retinopathy screening \par -Discussed intermittent fasting\par -weill refer to CDE for diet education \par \par Hypothyroidism: on replacement, levels stable \par - continue current dose of Levothyroxine 125 mcg daily\par \par HLD - stable, Increase with diet and exercise efforts \par \par Vitamin D Deficiency: Be more consistent with Vit D 58077 units once a week \par \par \par Osteoporosis Screening\par  - LMP about 5 years ago \par Last DEXA scan 4 years ago from [facility], was normal \par \par \par Follow up with MD in3 months\par \par \par

## 2023-06-12 NOTE — HISTORY OF PRESENT ILLNESS
[FreeTextEntry1] : Hypothyroidism and T2DM \par Lot of stress sister  suddenly in 2021 and now her aunt  3/2022 \par \par \par Quality: Type 2 DM\par Severity: controlled\par Duration: a couple years ago\par Onset: GDM 19 years ago, routine labs\par Modifying Factors: Better with medication\par Associated Symptoms: neuropathy \par \par Current Regimen:\par Trulicity 3 mg weekly - no issues \par no weight loss from medication \par \par Self blood sugar monitoring: once a day in AM usually in 90s \par Current BG 96--> cup of coffee and english muffin and cheese \par HgbA1C: 6.1%\par \par Weight: stable \par \par exercise: none, knee has been bothering her \par \par Diet: not has been best, can not eat too much protein, still earing carbs \par B- English muffin with peanut butter or ham and cheese, coffee with some sugar and milk \par L- brown rice with chicken\par D- steak, varies\par Snacks- fruit, gluten free pretzels \par \par \par Hypothyroidism\par -Levothyroxine 125 mcg daily- taking appropriately \par \par Date of last eye exam: 2022  (-) diabetic retinopathy \par Date of last foot exam: sporadically, denies numbness and tingling in feet \par Date of last flu vaccine: \par Date of last Pneumovax: no\par COVID vaccine \par

## 2023-10-02 LAB
25(OH)D3 SERPL-MCNC: 23 NG/ML
ALBUMIN SERPL ELPH-MCNC: 4.5 G/DL
ALP BLD-CCNC: 88 U/L
ALT SERPL-CCNC: 17 U/L
ANION GAP SERPL CALC-SCNC: 15 MMOL/L
AST SERPL-CCNC: 17 U/L
BILIRUB SERPL-MCNC: 0.3 MG/DL
BUN SERPL-MCNC: 12 MG/DL
CALCIUM SERPL-MCNC: 9.5 MG/DL
CHLORIDE SERPL-SCNC: 101 MMOL/L
CHOLEST SERPL-MCNC: 180 MG/DL
CO2 SERPL-SCNC: 23 MMOL/L
CREAT SERPL-MCNC: 0.78 MG/DL
CREAT SPEC-SCNC: 275 MG/DL
EGFR: 90 ML/MIN/1.73M2
ESTIMATED AVERAGE GLUCOSE: 128 MG/DL
GLUCOSE SERPL-MCNC: 80 MG/DL
HBA1C MFR BLD HPLC: 6.1 %
HDLC SERPL-MCNC: 56 MG/DL
LDLC SERPL CALC-MCNC: 104 MG/DL
MICROALBUMIN 24H UR DL<=1MG/L-MCNC: <1.2 MG/DL
MICROALBUMIN/CREAT 24H UR-RTO: NORMAL MG/G
NONHDLC SERPL-MCNC: 124 MG/DL
POTASSIUM SERPL-SCNC: 5.2 MMOL/L
PROT SERPL-MCNC: 7 G/DL
SODIUM SERPL-SCNC: 139 MMOL/L
T3FREE SERPL-MCNC: 3.56 PG/ML
T4 FREE SERPL-MCNC: 1.6 NG/DL
TRIGL SERPL-MCNC: 112 MG/DL
TSH SERPL-ACNC: 0.45 UIU/ML
VIT B12 SERPL-MCNC: 668 PG/ML

## 2023-10-04 ENCOUNTER — APPOINTMENT (OUTPATIENT)
Dept: ENDOCRINOLOGY | Facility: CLINIC | Age: 53
End: 2023-10-04
Payer: COMMERCIAL

## 2023-10-04 VITALS
DIASTOLIC BLOOD PRESSURE: 70 MMHG | SYSTOLIC BLOOD PRESSURE: 110 MMHG | HEIGHT: 61 IN | WEIGHT: 218 LBS | BODY MASS INDEX: 41.16 KG/M2

## 2023-10-04 DIAGNOSIS — E53.8 DEFICIENCY OF OTHER SPECIFIED B GROUP VITAMINS: ICD-10-CM

## 2023-10-04 LAB — GLUCOSE BLDC GLUCOMTR-MCNC: 235

## 2023-10-04 PROCEDURE — 82962 GLUCOSE BLOOD TEST: CPT

## 2023-10-04 PROCEDURE — 99214 OFFICE O/P EST MOD 30 MIN: CPT | Mod: 25

## 2023-10-04 RX ORDER — ERGOCALCIFEROL 1.25 MG/1
1.25 MG CAPSULE ORAL
Qty: 12 | Refills: 1 | Status: ACTIVE | COMMUNITY
Start: 2023-03-13 | End: 1900-01-01

## 2023-10-18 ENCOUNTER — APPOINTMENT (OUTPATIENT)
Dept: ENDOCRINOLOGY | Facility: CLINIC | Age: 53
End: 2023-10-18

## 2023-11-06 ENCOUNTER — NON-APPOINTMENT (OUTPATIENT)
Age: 53
End: 2023-11-06

## 2023-11-06 ENCOUNTER — APPOINTMENT (OUTPATIENT)
Dept: CARDIOLOGY | Facility: CLINIC | Age: 53
End: 2023-11-06
Payer: COMMERCIAL

## 2023-11-06 VITALS
WEIGHT: 216 LBS | BODY MASS INDEX: 40.78 KG/M2 | SYSTOLIC BLOOD PRESSURE: 120 MMHG | HEART RATE: 96 BPM | DIASTOLIC BLOOD PRESSURE: 68 MMHG | TEMPERATURE: 98.2 F | OXYGEN SATURATION: 98 % | HEIGHT: 61 IN

## 2023-11-06 VITALS — DIASTOLIC BLOOD PRESSURE: 70 MMHG | SYSTOLIC BLOOD PRESSURE: 124 MMHG

## 2023-11-06 DIAGNOSIS — R94.31 ABNORMAL ELECTROCARDIOGRAM [ECG] [EKG]: ICD-10-CM

## 2023-11-06 PROCEDURE — 99204 OFFICE O/P NEW MOD 45 MIN: CPT

## 2023-11-06 PROCEDURE — 93000 ELECTROCARDIOGRAM COMPLETE: CPT

## 2023-11-06 RX ORDER — IBUPROFEN 400 MG/1
400 TABLET, FILM COATED ORAL
Refills: 0 | Status: DISCONTINUED | COMMUNITY
End: 2023-11-06

## 2023-11-06 RX ORDER — SODIUM SULFATE, POTASSIUM SULFATE, MAGNESIUM SULFATE 17.5; 3.13; 1.6 G/ML; G/ML; G/ML
17.5-3.13-1.6 SOLUTION, CONCENTRATE ORAL
Qty: 1 | Refills: 0 | Status: DISCONTINUED | COMMUNITY
Start: 2020-01-27 | End: 2023-11-06

## 2023-11-13 PROBLEM — R94.31 ABNORMAL EKG: Status: ACTIVE | Noted: 2018-04-11

## 2024-01-04 ENCOUNTER — APPOINTMENT (OUTPATIENT)
Dept: CARDIOLOGY | Facility: CLINIC | Age: 54
End: 2024-01-04
Payer: COMMERCIAL

## 2024-01-04 PROCEDURE — 93306 TTE W/DOPPLER COMPLETE: CPT

## 2024-01-12 ENCOUNTER — NON-APPOINTMENT (OUTPATIENT)
Age: 54
End: 2024-01-12

## 2024-01-17 ENCOUNTER — APPOINTMENT (OUTPATIENT)
Dept: ENDOCRINOLOGY | Facility: CLINIC | Age: 54
End: 2024-01-17
Payer: COMMERCIAL

## 2024-01-17 VITALS — HEART RATE: 101 BPM | OXYGEN SATURATION: 98 % | SYSTOLIC BLOOD PRESSURE: 116 MMHG | DIASTOLIC BLOOD PRESSURE: 78 MMHG

## 2024-01-17 VITALS — WEIGHT: 213 LBS | BODY MASS INDEX: 40.22 KG/M2 | HEIGHT: 61 IN

## 2024-01-17 DIAGNOSIS — E55.9 VITAMIN D DEFICIENCY, UNSPECIFIED: ICD-10-CM

## 2024-01-17 DIAGNOSIS — E11.9 TYPE 2 DIABETES MELLITUS W/OUT COMPLICATIONS: ICD-10-CM

## 2024-01-17 DIAGNOSIS — E03.9 HYPOTHYROIDISM, UNSPECIFIED: ICD-10-CM

## 2024-01-17 LAB — GLUCOSE BLDC GLUCOMTR-MCNC: 107

## 2024-01-17 PROCEDURE — 99214 OFFICE O/P EST MOD 30 MIN: CPT | Mod: 25

## 2024-01-17 PROCEDURE — 82962 GLUCOSE BLOOD TEST: CPT

## 2024-01-17 NOTE — HISTORY OF PRESENT ILLNESS
[FreeTextEntry1] : INTERVAL HISTORY: Had COVID 1, then developed dental abscess . On antibiotics.  Quality: Type 2 DM Severity: controlled Duration: a couple years ago Onset: GDM 19 years ago, routine labs Modifying Factors: Better with medication Associated Symptoms: neuropathy  Current Regimen: Trulicity 3 mg weekly no weight loss from medication but has not been exercising  Current B, fasting No recent SMBG.  Weight: stable Exercise: none, just walks at work Diet: trying to eat healthy, was cheating more over the holidays. Eats carbs, but tries to focus on a balanced meal. B- egg with toast, coffee with sugar, oatmeal with fruit L- no typical lunch, meals vary. Hospital food. D- meat, vegetable, starch Snacks- trail mix pack  Eye: last exam 2022 (-) diabetic retinopathy. Reports recent blurry vision. Feet: sporadically, denies numbness and tingling in feet Kidney: none Heart: none. ECHO 2024 was normal per patient =========================== Hypothyroidism, diagnosed at age 39 on work up for fatigue and lack of energy FH: mother with hypothyroidism Current regimen: Levothyroxine 125 mcg daily- taking appropriately

## 2024-01-17 NOTE — ASSESSMENT
[FreeTextEntry1] : 53 year old female with T2DM, hypothyroidism, hyperlipidemia, and vitamin D deficiency. Glycemic control is unknown without recent labs or glucose monitoring.  1. T2DM -Continue Trulicity 3 mg weekly for now. -Check A1c now. Will likely switch from Trulicity to Mounjaro to further help with weight loss and glycemic control. Plan to start 2.5 mg weekly and increase by 2.5 mg every 4 weeks. -Reviewed importance of lifestyle modifications- diet, exercise, and weight loss- to improve glycemic control. -Reviewed balanced diet using the plate method. -RTO for follow-up with NP in 3 months and MD in 6 months.  2. Hypothyroidism -Continue LT4 126 mcg daily. -Repeat TFTs now.  3. Hyperlipidemia -Low fat diet. -Repeat lipids now.  4. Vitamin D deficiency -Continue vitamin D 50,000 IU weekly. -Repeat level now.

## 2024-01-17 NOTE — PHYSICAL EXAM
[Alert] : alert [Well Nourished] : well nourished [Obese] : obese [No Acute Distress] : no acute distress [Well Developed] : well developed [Normal Sclera/Conjunctiva] : normal sclera/conjunctiva [EOMI] : extra ocular movement intact [No Proptosis] : no proptosis [Thyroid Not Enlarged] : the thyroid was not enlarged [No Thyroid Nodules] : no palpable thyroid nodules [No Respiratory Distress] : no respiratory distress [No Accessory Muscle Use] : no accessory muscle use [Clear to Auscultation] : lungs were clear to auscultation bilaterally [Normal S1, S2] : normal S1 and S2 [Normal Rate] : heart rate was normal [Regular Rhythm] : with a regular rhythm [No Edema] : no peripheral edema [Normal Anterior Cervical Nodes] : no anterior cervical lymphadenopathy [Normal Posterior Cervical Nodes] : no posterior cervical lymphadenopathy [Acanthosis Nigricans] : no acanthosis nigricans [No Tremors] : no tremors [Oriented x3] : oriented to person, place, and time [Normal Affect] : the affect was normal [Normal Mood] : the mood was normal

## 2024-01-17 NOTE — REVIEW OF SYSTEMS
[Fatigue] : fatigue [Anxiety] : anxiety [Polydipsia] : polydipsia [Recent Weight Gain (___ Lbs)] : no recent weight gain [Recent Weight Loss (___ Lbs)] : no recent weight loss [Chest Pain] : no chest pain [Palpitations] : no palpitations [Shortness Of Breath] : no shortness of breath [Nausea] : no nausea [Constipation] : no constipation [Abdominal Pain] : no abdominal pain [Vomiting] : no vomiting [Diarrhea] : no diarrhea [Polyuria] : no polyuria [Tremors] : no tremors [Pain/Numbness of Digits] : no pain/numbness of digits [Depression] : no depression

## 2024-01-19 LAB
25(OH)D3 SERPL-MCNC: 26.4 NG/ML
ALBUMIN SERPL ELPH-MCNC: 4.6 G/DL
ALP BLD-CCNC: 88 U/L
ALT SERPL-CCNC: 23 U/L
ANION GAP SERPL CALC-SCNC: 11 MMOL/L
AST SERPL-CCNC: 19 U/L
BILIRUB SERPL-MCNC: 0.3 MG/DL
BUN SERPL-MCNC: 16 MG/DL
CALCIUM SERPL-MCNC: 9.4 MG/DL
CHLORIDE SERPL-SCNC: 104 MMOL/L
CHOLEST SERPL-MCNC: 188 MG/DL
CO2 SERPL-SCNC: 26 MMOL/L
CREAT SERPL-MCNC: 0.81 MG/DL
CREAT SPEC-SCNC: 176 MG/DL
EGFR: 86 ML/MIN/1.73M2
ESTIMATED AVERAGE GLUCOSE: 128 MG/DL
GLUCOSE SERPL-MCNC: 91 MG/DL
HBA1C MFR BLD HPLC: 6.1 %
HCT VFR BLD CALC: 41.5 %
HDLC SERPL-MCNC: 56 MG/DL
HGB BLD-MCNC: 13 G/DL
LDLC SERPL CALC-MCNC: 116 MG/DL
MCHC RBC-ENTMCNC: 25.7 PG
MCHC RBC-ENTMCNC: 31.3 GM/DL
MCV RBC AUTO: 82 FL
MICROALBUMIN 24H UR DL<=1MG/L-MCNC: <1.2 MG/DL
MICROALBUMIN/CREAT 24H UR-RTO: NORMAL MG/G
NONHDLC SERPL-MCNC: 132 MG/DL
PLATELET # BLD AUTO: 379 K/UL
POTASSIUM SERPL-SCNC: 4.8 MMOL/L
PROT SERPL-MCNC: 7.4 G/DL
RBC # BLD: 5.06 M/UL
RBC # FLD: 15.1 %
SODIUM SERPL-SCNC: 141 MMOL/L
T4 FREE SERPL-MCNC: 2 NG/DL
TRIGL SERPL-MCNC: 89 MG/DL
TSH SERPL-ACNC: 0.97 UIU/ML
VIT B12 SERPL-MCNC: 908 PG/ML
WBC # FLD AUTO: 7.96 K/UL

## 2024-03-11 ENCOUNTER — APPOINTMENT (OUTPATIENT)
Dept: CARDIOLOGY | Facility: CLINIC | Age: 54
End: 2024-03-11
Payer: COMMERCIAL

## 2024-03-11 VITALS — BODY MASS INDEX: 39.01 KG/M2 | WEIGHT: 212 LBS | HEIGHT: 62 IN

## 2024-03-11 VITALS — DIASTOLIC BLOOD PRESSURE: 70 MMHG | HEART RATE: 96 BPM | SYSTOLIC BLOOD PRESSURE: 138 MMHG | OXYGEN SATURATION: 100 %

## 2024-03-11 DIAGNOSIS — E78.00 PURE HYPERCHOLESTEROLEMIA, UNSPECIFIED: ICD-10-CM

## 2024-03-11 PROCEDURE — 99213 OFFICE O/P EST LOW 20 MIN: CPT

## 2024-03-11 PROCEDURE — 93000 ELECTROCARDIOGRAM COMPLETE: CPT

## 2024-06-03 ENCOUNTER — NON-APPOINTMENT (OUTPATIENT)
Age: 54
End: 2024-06-03

## 2024-06-03 PROBLEM — E78.00 ELEVATED LDL CHOLESTEROL LEVEL: Status: ACTIVE | Noted: 2020-08-19

## 2024-06-03 NOTE — REASON FOR VISIT
[Symptom and Test Evaluation] : symptom and test evaluation [Hyperlipidemia] : hyperlipidemia [FreeTextEntry1] : 52 y/o F with PMH of Hypothyroidism and DM2 presents for cardiac follow up due to underlying risk factors for CAD and here for follow up   Patient notes no chest pain or shortness of breath at rest or upon exertion; notes that she has had no lower extremity edema orthopnea or PND  No dizziness or lightheadedness or syncope

## 2024-06-03 NOTE — ASSESSMENT
[FreeTextEntry1] : 54 y/o F with PMH of Hypothyroidism and DM2 presents for cardiac follow up due to underlying risk factors for CAD   1) Risk factors for CAD with hx of Dyslipidemia and DM2  - no chest pain or shortness of breath  - EKG Sinus Rhythm @ 97 bpm  WITHIN NORMAL LIMITS - TTE done shows normal LVEF with no significant valvulopathy   2) Dyslipidemia and DM2  - not on a statin and discussed that discussed regardless need s to be on a statin will then refer for CT calcium score to assess for Agatston score and risk stratify   Diane Quiros D.O. [facility] Card[facility]/Vascular Cardiology -[facility] Telephone # [phone]

## 2024-06-03 NOTE — CARDIOLOGY SUMMARY
[de-identified] : Sinus Rhythm @ 90 bpm, with normal axis  Sinus Rhythm @ 97 bpm  WITHIN NORMAL LIMITS [de-identified] : NM Myocardial in 2018: LV normal in size; tracer uptake homogenous throughout the left ventricle  Normal study no evidence of myocardial infarction or ischemia  [de-identified] : 2024: Left ventricular systolic function is normal with an ejection fraction of 53 % by Villafuerte's method of disks with an ejection fraction visually estimated at 55 to 60 %. Normal left ventricular diastolic function. Normal right ventricular cavity size and systolic function. Trileaflet aortic valve with normal systolic excursion. No pericardial effusion seen. Compared to the transthoracic echocardiogram performed on 5/22/2018 there have been no significant interval changes.  TTE: 2018: Normal LV wall size and LVEF 63% with normal RV size and function with no significant valvular issues and no evidence of pericardial effusion

## 2024-07-12 ENCOUNTER — NON-APPOINTMENT (OUTPATIENT)
Age: 54
End: 2024-07-12

## 2024-07-15 ENCOUNTER — RX RENEWAL (OUTPATIENT)
Age: 54
End: 2024-07-15

## 2024-07-23 ENCOUNTER — APPOINTMENT (OUTPATIENT)
Dept: ENDOCRINOLOGY | Facility: CLINIC | Age: 54
End: 2024-07-23
Payer: COMMERCIAL

## 2024-07-23 VITALS
DIASTOLIC BLOOD PRESSURE: 82 MMHG | HEART RATE: 94 BPM | WEIGHT: 212.8 LBS | SYSTOLIC BLOOD PRESSURE: 122 MMHG | OXYGEN SATURATION: 99 % | BODY MASS INDEX: 39.16 KG/M2 | HEIGHT: 62 IN

## 2024-07-23 DIAGNOSIS — E11.9 TYPE 2 DIABETES MELLITUS W/OUT COMPLICATIONS: ICD-10-CM

## 2024-07-23 DIAGNOSIS — E55.9 VITAMIN D DEFICIENCY, UNSPECIFIED: ICD-10-CM

## 2024-07-23 DIAGNOSIS — E78.00 PURE HYPERCHOLESTEROLEMIA, UNSPECIFIED: ICD-10-CM

## 2024-07-23 DIAGNOSIS — E03.9 HYPOTHYROIDISM, UNSPECIFIED: ICD-10-CM

## 2024-07-23 LAB — GLUCOSE BLDC GLUCOMTR-MCNC: 107

## 2024-07-23 PROCEDURE — 99214 OFFICE O/P EST MOD 30 MIN: CPT

## 2024-07-23 PROCEDURE — G2211 COMPLEX E/M VISIT ADD ON: CPT

## 2024-07-23 PROCEDURE — 82962 GLUCOSE BLOOD TEST: CPT

## 2024-07-23 RX ORDER — TIRZEPATIDE 2.5 MG/.5ML
2.5 INJECTION, SOLUTION SUBCUTANEOUS
Qty: 1 | Refills: 0 | Status: ACTIVE | COMMUNITY
Start: 2024-07-23 | End: 1900-01-01

## 2024-07-23 NOTE — HISTORY OF PRESENT ILLNESS
[FreeTextEntry1] : INTERVAL HISTORY: recent UTI.  Quality: Type 2 DM Severity: controlled Duration: a couple years ago Onset: GDM 19 years ago, routine labs Modifying Factors: Better with medication Associated Symptoms: neuropathy  Current Regimen: Trulicity 3 mg weekly- tolerating well, but no weight loss/appetite suppression  Current B, fasting No recent SMBG.  Weight: stable Exercise: none, just walks at work Diet: trying to eat healthy, was cheating more over the holidays. Eats carbs, but tries to focus on a balanced meal. B- egg with toast, coffee with sugar, oatmeal with fruit L- no typical lunch, meals vary. Hospital food. D- meat, vegetable, starch Snacks- trail mix pack  Eye: last exam 2022 (-) diabetic retinopathy. Reports recent blurry vision. Feet: denies neuropathy Kidney: none Heart: none. ECHO 2024 was normal per patient =========================== Hypothyroidism, diagnosed at age 39 on work up for fatigue and lack of energy FH: mother with hypothyroidism Current regimen: Levothyroxine 125 mcg daily- taking appropriately

## 2024-07-23 NOTE — PHYSICAL EXAM
[Alert] : alert [Well Nourished] : well nourished [Obese] : obese [No Acute Distress] : no acute distress [Well Developed] : well developed [Normal Sclera/Conjunctiva] : normal sclera/conjunctiva [EOMI] : extra ocular movement intact [No Proptosis] : no proptosis [Thyroid Not Enlarged] : the thyroid was not enlarged [No Thyroid Nodules] : no palpable thyroid nodules [No Respiratory Distress] : no respiratory distress [No Accessory Muscle Use] : no accessory muscle use [Clear to Auscultation] : lungs were clear to auscultation bilaterally [Normal S1, S2] : normal S1 and S2 [Normal Rate] : heart rate was normal [Regular Rhythm] : with a regular rhythm [No Edema] : no peripheral edema [Normal Anterior Cervical Nodes] : no anterior cervical lymphadenopathy [No Tremors] : no tremors [Oriented x3] : oriented to person, place, and time [Normal Affect] : the affect was normal [Normal Mood] : the mood was normal [Acanthosis Nigricans] : no acanthosis nigricans

## 2024-07-23 NOTE — ASSESSMENT
[FreeTextEntry1] : 54 year old female with T2DM, hypothyroidism, hyperlipidemia, and vitamin D deficiency. Glycemic control is excellent.  1. T2DM- A1c 6%. -Stop Trulicity. -Start Mounjaro 2.5 mg weekly x 4 weeks, then increase to 5 mg weekly.  -Reviewed importance of lifestyle modifications- diet, exercise, and weight loss- to improve glycemic control. -ALEX normal July 2024.  -Needs eye exam; stressed importance. -Repeat A1c and RTO for follow-up with NP in 3 months and MD in 6 months.  2. Hypothyroidism- euthyroid on replacement T4. -Continue LT4 125 mcg daily. -Repeat TFTs in 3 months.  3. Hyperlipidemia- not on statin. Chol 198, . -Reviewed LDL goal <70 and risks of hyperlipidemia in setting of DM. -Suggest statin; not ready at this time. -Reviewed low fat diet. -Repeat lipids in 3 months. If LDL continues to trend up, will reconsider statin.  4. Vitamin D deficiency -Resume vitamin D 50,000 IU weekly. -Repeat level in 3 months.  5. Obesity -Mounjaro as above.

## 2024-07-23 NOTE — REVIEW OF SYSTEMS
[Fatigue] : fatigue [Heat Intolerance] : heat intolerance [Recent Weight Gain (___ Lbs)] : no recent weight gain [Recent Weight Loss (___ Lbs)] : no recent weight loss [Chest Pain] : no chest pain [Palpitations] : no palpitations [Shortness Of Breath] : no shortness of breath [Nausea] : no nausea [Constipation] : no constipation [Abdominal Pain] : no abdominal pain [Vomiting] : no vomiting [Diarrhea] : no diarrhea [Polyuria] : no polyuria [Tremors] : no tremors [Pain/Numbness of Digits] : no pain/numbness of digits [Anxiety] : no anxiety [Polydipsia] : no polydipsia

## 2024-10-07 NOTE — ASSESSMENT
[Importance of Diet and Exercise] : importance of diet and exercise to improve glycemic control, achieve weight loss and improve cardiovascular health [FreeTextEntry1] : 51 y/o female with Type 2 DM, Elevated LDL, Vitamin D Deficiency, and Hypothyroidism.\par \par Plan: \par Type 2 DM: controlled\par - continue current medication regimen: Trulicity 1.5 mg weekly \par - educated on healthy food choices - encouraged to restart Weight Watchers \par - encouraged to increase routine exercise\par - educated on the importance of routine retinopathy screening \par - follow up with podiatry and neurology due to numbness in left 1st toe\par \par Hypothyroidism: euthyroid on replacement\par - continue current dose of Levothyroxine 125 mcg daily\par \par Elevated LDL- follow fat diet, may benefit from a statin in the future \par \par Vitamin D Deficiency: low - start Vitamin 2000 units daily \par \par Follow up with cardiology/PCP due to palpitations with anxiety \par \par follow up with GI  for reflux \par \par psych- support given for sister's death - will consider counselling \par \par Labs and follow up in 4 months. Clear

## 2024-11-14 ENCOUNTER — APPOINTMENT (OUTPATIENT)
Dept: MAMMOGRAPHY | Facility: CLINIC | Age: 54
End: 2024-11-14
Payer: COMMERCIAL

## 2024-11-14 ENCOUNTER — OUTPATIENT (OUTPATIENT)
Dept: OUTPATIENT SERVICES | Facility: HOSPITAL | Age: 54
LOS: 1 days | End: 2024-11-14
Payer: COMMERCIAL

## 2024-11-14 DIAGNOSIS — Z98.89 OTHER SPECIFIED POSTPROCEDURAL STATES: Chronic | ICD-10-CM

## 2024-11-14 DIAGNOSIS — Z12.31 ENCOUNTER FOR SCREENING MAMMOGRAM FOR MALIGNANT NEOPLASM OF BREAST: ICD-10-CM

## 2024-11-14 DIAGNOSIS — D17.1 BENIGN LIPOMATOUS NEOPLASM OF SKIN AND SUBCUTANEOUS TISSUE OF TRUNK: Chronic | ICD-10-CM

## 2024-11-14 PROCEDURE — 77067 SCR MAMMO BI INCL CAD: CPT

## 2024-11-14 PROCEDURE — 77063 BREAST TOMOSYNTHESIS BI: CPT | Mod: 26

## 2024-11-14 PROCEDURE — 77067 SCR MAMMO BI INCL CAD: CPT | Mod: 26

## 2024-11-14 PROCEDURE — 77063 BREAST TOMOSYNTHESIS BI: CPT

## 2024-11-22 ENCOUNTER — APPOINTMENT (OUTPATIENT)
Dept: ENDOCRINOLOGY | Facility: CLINIC | Age: 54
End: 2024-11-22

## 2025-01-09 ENCOUNTER — NON-APPOINTMENT (OUTPATIENT)
Age: 55
End: 2025-01-09

## 2025-01-09 ENCOUNTER — APPOINTMENT (OUTPATIENT)
Dept: OPHTHALMOLOGY | Facility: CLINIC | Age: 55
End: 2025-01-09
Payer: COMMERCIAL

## 2025-01-09 PROCEDURE — 92014 COMPRE OPH EXAM EST PT 1/>: CPT

## 2025-02-28 ENCOUNTER — NON-APPOINTMENT (OUTPATIENT)
Age: 55
End: 2025-02-28

## 2025-02-28 ENCOUNTER — APPOINTMENT (OUTPATIENT)
Dept: ENDOCRINOLOGY | Facility: CLINIC | Age: 55
End: 2025-02-28
Payer: COMMERCIAL

## 2025-02-28 VITALS
DIASTOLIC BLOOD PRESSURE: 80 MMHG | BODY MASS INDEX: 38.09 KG/M2 | SYSTOLIC BLOOD PRESSURE: 120 MMHG | OXYGEN SATURATION: 97 % | WEIGHT: 207 LBS | HEART RATE: 94 BPM | HEIGHT: 62 IN

## 2025-02-28 DIAGNOSIS — E55.9 VITAMIN D DEFICIENCY, UNSPECIFIED: ICD-10-CM

## 2025-02-28 DIAGNOSIS — E53.8 DEFICIENCY OF OTHER SPECIFIED B GROUP VITAMINS: ICD-10-CM

## 2025-02-28 DIAGNOSIS — E03.9 HYPOTHYROIDISM, UNSPECIFIED: ICD-10-CM

## 2025-02-28 DIAGNOSIS — E11.65 TYPE 2 DIABETES MELLITUS WITH HYPERGLYCEMIA: ICD-10-CM

## 2025-02-28 DIAGNOSIS — E66.9 OBESITY, UNSPECIFIED: ICD-10-CM

## 2025-02-28 LAB — GLUCOSE BLDC GLUCOMTR-MCNC: 92

## 2025-02-28 PROCEDURE — 99215 OFFICE O/P EST HI 40 MIN: CPT

## 2025-02-28 PROCEDURE — 36415 COLL VENOUS BLD VENIPUNCTURE: CPT

## 2025-02-28 PROCEDURE — 82962 GLUCOSE BLOOD TEST: CPT

## 2025-02-28 PROCEDURE — G0447 BEHAVIOR COUNSEL OBESITY 15M: CPT | Mod: 59

## 2025-02-28 RX ORDER — TIRZEPATIDE 5 MG/.5ML
5 INJECTION, SOLUTION SUBCUTANEOUS
Qty: 3 | Refills: 1 | Status: ACTIVE | COMMUNITY
Start: 2025-02-28 | End: 1900-01-01

## 2025-03-01 ENCOUNTER — NON-APPOINTMENT (OUTPATIENT)
Age: 55
End: 2025-03-01

## 2025-03-02 LAB
25(OH)D3 SERPL-MCNC: 24.2 NG/ML
ALBUMIN SERPL ELPH-MCNC: 4.4 G/DL
ALP BLD-CCNC: 92 U/L
ALT SERPL-CCNC: 14 U/L
ANION GAP SERPL CALC-SCNC: 12 MMOL/L
AST SERPL-CCNC: 19 U/L
BILIRUB SERPL-MCNC: 0.4 MG/DL
BUN SERPL-MCNC: 15 MG/DL
CALCIUM SERPL-MCNC: 9.9 MG/DL
CHLORIDE SERPL-SCNC: 103 MMOL/L
CHOLEST SERPL-MCNC: 186 MG/DL
CO2 SERPL-SCNC: 25 MMOL/L
CREAT SERPL-MCNC: 0.83 MG/DL
CREAT SPEC-SCNC: 61 MG/DL
EGFR: 83 ML/MIN/1.73M2
ESTIMATED AVERAGE GLUCOSE: 128 MG/DL
GLUCOSE SERPL-MCNC: 91 MG/DL
HBA1C MFR BLD HPLC: 6.1 %
HCT VFR BLD CALC: 41.7 %
HDLC SERPL-MCNC: 64 MG/DL
HGB BLD-MCNC: 13.6 G/DL
LDLC SERPL CALC-MCNC: 109 MG/DL
MCHC RBC-ENTMCNC: 27.7 PG
MCHC RBC-ENTMCNC: 32.6 G/DL
MCV RBC AUTO: 84.9 FL
MICROALBUMIN 24H UR DL<=1MG/L-MCNC: <1.2 MG/DL
MICROALBUMIN/CREAT 24H UR-RTO: NORMAL MG/G
NONHDLC SERPL-MCNC: 122 MG/DL
PLATELET # BLD AUTO: 286 K/UL
POTASSIUM SERPL-SCNC: 4.8 MMOL/L
PROT SERPL-MCNC: 7.3 G/DL
RBC # BLD: 4.91 M/UL
RBC # FLD: 13.7 %
SODIUM SERPL-SCNC: 139 MMOL/L
T4 FREE SERPL-MCNC: 1.4 NG/DL
TRIGL SERPL-MCNC: 71 MG/DL
TSH SERPL-ACNC: 2.39 UIU/ML
WBC # FLD AUTO: 7.34 K/UL

## 2025-03-03 ENCOUNTER — NON-APPOINTMENT (OUTPATIENT)
Age: 55
End: 2025-03-03

## 2025-04-25 ENCOUNTER — APPOINTMENT (OUTPATIENT)
Dept: ULTRASOUND IMAGING | Facility: CLINIC | Age: 55
End: 2025-04-25

## 2025-04-25 ENCOUNTER — OUTPATIENT (OUTPATIENT)
Dept: OUTPATIENT SERVICES | Facility: HOSPITAL | Age: 55
LOS: 1 days | End: 2025-04-25
Payer: COMMERCIAL

## 2025-04-25 DIAGNOSIS — D17.1 BENIGN LIPOMATOUS NEOPLASM OF SKIN AND SUBCUTANEOUS TISSUE OF TRUNK: Chronic | ICD-10-CM

## 2025-04-25 DIAGNOSIS — D48.9 NEOPLASM OF UNCERTAIN BEHAVIOR, UNSPECIFIED: Chronic | ICD-10-CM

## 2025-04-25 DIAGNOSIS — E11.65 TYPE 2 DIABETES MELLITUS WITH HYPERGLYCEMIA: ICD-10-CM

## 2025-04-25 DIAGNOSIS — E55.9 VITAMIN D DEFICIENCY, UNSPECIFIED: ICD-10-CM

## 2025-04-25 DIAGNOSIS — Z98.89 OTHER SPECIFIED POSTPROCEDURAL STATES: Chronic | ICD-10-CM

## 2025-04-25 DIAGNOSIS — E53.8 DEFICIENCY OF OTHER SPECIFIED B GROUP VITAMINS: ICD-10-CM

## 2025-04-25 DIAGNOSIS — E03.9 HYPOTHYROIDISM, UNSPECIFIED: ICD-10-CM

## 2025-04-25 PROCEDURE — 76536 US EXAM OF HEAD AND NECK: CPT | Mod: 26

## 2025-04-25 PROCEDURE — 76536 US EXAM OF HEAD AND NECK: CPT

## 2025-06-13 ENCOUNTER — APPOINTMENT (OUTPATIENT)
Dept: ORTHOPEDIC SURGERY | Facility: CLINIC | Age: 55
End: 2025-06-13
Payer: COMMERCIAL

## 2025-06-13 VITALS — HEIGHT: 62 IN | BODY MASS INDEX: 40.48 KG/M2 | WEIGHT: 220 LBS

## 2025-06-13 PROBLEM — M25.562 CHRONIC PAIN OF LEFT KNEE: Status: ACTIVE | Noted: 2025-06-13

## 2025-06-13 PROCEDURE — 99204 OFFICE O/P NEW MOD 45 MIN: CPT

## 2025-06-13 PROCEDURE — 73564 X-RAY EXAM KNEE 4 OR MORE: CPT | Mod: LT

## 2025-06-13 RX ORDER — METHYLPREDNISOLONE 4 MG/1
4 TABLET ORAL
Qty: 1 | Refills: 0 | Status: ACTIVE | COMMUNITY
Start: 2025-06-13 | End: 1900-01-01

## 2025-07-01 ENCOUNTER — NON-APPOINTMENT (OUTPATIENT)
Age: 55
End: 2025-07-01

## 2025-08-05 ENCOUNTER — EMERGENCY (EMERGENCY)
Facility: HOSPITAL | Age: 55
LOS: 1 days | End: 2025-08-05
Attending: EMERGENCY MEDICINE
Payer: COMMERCIAL

## 2025-08-05 VITALS
OXYGEN SATURATION: 98 % | SYSTOLIC BLOOD PRESSURE: 127 MMHG | DIASTOLIC BLOOD PRESSURE: 84 MMHG | HEART RATE: 95 BPM | TEMPERATURE: 98 F | RESPIRATION RATE: 16 BRPM

## 2025-08-05 DIAGNOSIS — Z98.89 OTHER SPECIFIED POSTPROCEDURAL STATES: Chronic | ICD-10-CM

## 2025-08-05 DIAGNOSIS — D17.1 BENIGN LIPOMATOUS NEOPLASM OF SKIN AND SUBCUTANEOUS TISSUE OF TRUNK: Chronic | ICD-10-CM

## 2025-08-05 DIAGNOSIS — D48.9 NEOPLASM OF UNCERTAIN BEHAVIOR, UNSPECIFIED: Chronic | ICD-10-CM

## 2025-08-05 PROCEDURE — 99283 EMERGENCY DEPT VISIT LOW MDM: CPT | Mod: 25

## 2025-08-05 PROCEDURE — 96372 THER/PROPH/DIAG INJ SC/IM: CPT

## 2025-08-05 PROCEDURE — 99284 EMERGENCY DEPT VISIT MOD MDM: CPT

## 2025-08-05 RX ORDER — METHYLPREDNISOLONE ACETATE 80 MG/ML
1 INJECTION, SUSPENSION INTRA-ARTICULAR; INTRALESIONAL; INTRAMUSCULAR; SOFT TISSUE
Qty: 1 | Refills: 0
Start: 2025-08-05 | End: 2025-08-10

## 2025-08-05 RX ORDER — IBUPROFEN 200 MG
1 TABLET ORAL
Qty: 15 | Refills: 0
Start: 2025-08-05 | End: 2025-08-09

## 2025-08-05 RX ORDER — IBUPROFEN 200 MG
600 TABLET ORAL ONCE
Refills: 0 | Status: COMPLETED | OUTPATIENT
Start: 2025-08-05 | End: 2025-08-05

## 2025-08-05 RX ORDER — LIDOCAINE HYDROCHLORIDE 20 MG/ML
1 JELLY TOPICAL ONCE
Refills: 0 | Status: COMPLETED | OUTPATIENT
Start: 2025-08-05 | End: 2025-08-05

## 2025-08-05 RX ORDER — DEXAMETHASONE 0.5 MG/1
10 TABLET ORAL ONCE
Refills: 0 | Status: COMPLETED | OUTPATIENT
Start: 2025-08-05 | End: 2025-08-05

## 2025-08-05 RX ORDER — ACETAMINOPHEN 500 MG/5ML
975 LIQUID (ML) ORAL ONCE
Refills: 0 | Status: COMPLETED | OUTPATIENT
Start: 2025-08-05 | End: 2025-08-05

## 2025-08-05 RX ORDER — METHOCARBAMOL 500 MG/1
2 TABLET, FILM COATED ORAL
Qty: 24 | Refills: 0
Start: 2025-08-05 | End: 2025-08-08

## 2025-08-05 RX ADMIN — Medication 975 MILLIGRAM(S): at 21:06

## 2025-08-05 RX ADMIN — Medication 600 MILLIGRAM(S): at 21:06

## 2025-08-05 RX ADMIN — DEXAMETHASONE 10 MILLIGRAM(S): 0.5 TABLET ORAL at 22:05

## 2025-08-05 RX ADMIN — LIDOCAINE HYDROCHLORIDE 1 PATCH: 20 JELLY TOPICAL at 21:06

## 2025-08-29 ENCOUNTER — APPOINTMENT (OUTPATIENT)
Dept: ENDOCRINOLOGY | Facility: CLINIC | Age: 55
End: 2025-08-29

## 2025-09-16 ENCOUNTER — NON-APPOINTMENT (OUTPATIENT)
Age: 55
End: 2025-09-16